# Patient Record
Sex: MALE | Race: WHITE | NOT HISPANIC OR LATINO | Employment: FULL TIME | URBAN - METROPOLITAN AREA
[De-identification: names, ages, dates, MRNs, and addresses within clinical notes are randomized per-mention and may not be internally consistent; named-entity substitution may affect disease eponyms.]

---

## 2019-04-26 ENCOUNTER — HOSPITAL ENCOUNTER (EMERGENCY)
Facility: HOSPITAL | Age: 24
Discharge: HOME | End: 2019-04-26
Attending: EMERGENCY MEDICINE
Payer: COMMERCIAL

## 2019-04-26 VITALS
DIASTOLIC BLOOD PRESSURE: 78 MMHG | WEIGHT: 195 LBS | RESPIRATION RATE: 20 BRPM | HEIGHT: 69 IN | SYSTOLIC BLOOD PRESSURE: 145 MMHG | OXYGEN SATURATION: 97 % | BODY MASS INDEX: 28.88 KG/M2 | HEART RATE: 94 BPM | TEMPERATURE: 99 F

## 2019-04-26 DIAGNOSIS — R51.9 ACUTE NONINTRACTABLE HEADACHE, UNSPECIFIED HEADACHE TYPE: ICD-10-CM

## 2019-04-26 DIAGNOSIS — J06.9 VIRAL URI WITH COUGH: Primary | ICD-10-CM

## 2019-04-26 DIAGNOSIS — R05.9 COUGH: ICD-10-CM

## 2019-04-26 PROCEDURE — 99283 EMERGENCY DEPT VISIT LOW MDM: CPT

## 2019-04-26 PROCEDURE — 25000003 PHARM REV CODE 250: Performed by: PHYSICIAN ASSISTANT

## 2019-04-26 RX ORDER — KETOROLAC TROMETHAMINE 10 MG/1
10 TABLET, FILM COATED ORAL
Status: COMPLETED | OUTPATIENT
Start: 2019-04-26 | End: 2019-04-26

## 2019-04-26 RX ORDER — ONDANSETRON 4 MG/1
4 TABLET, ORALLY DISINTEGRATING ORAL EVERY 6 HOURS PRN
Qty: 20 TABLET | Refills: 0 | Status: ON HOLD | OUTPATIENT
Start: 2019-04-26 | End: 2019-08-02 | Stop reason: HOSPADM

## 2019-04-26 RX ORDER — KETOROLAC TROMETHAMINE 10 MG/1
10 TABLET, FILM COATED ORAL EVERY 6 HOURS
Qty: 12 TABLET | Refills: 0 | Status: SHIPPED | OUTPATIENT
Start: 2019-04-26 | End: 2019-04-29

## 2019-04-26 RX ORDER — ONDANSETRON 4 MG/1
4 TABLET, ORALLY DISINTEGRATING ORAL
Status: COMPLETED | OUTPATIENT
Start: 2019-04-26 | End: 2019-04-26

## 2019-04-26 RX ADMIN — KETOROLAC TROMETHAMINE 10 MG: 10 TABLET, FILM COATED ORAL at 05:04

## 2019-04-26 RX ADMIN — ONDANSETRON 4 MG: 4 TABLET, ORALLY DISINTEGRATING ORAL at 05:04

## 2019-04-26 NOTE — ED NOTES
Patient identifiers for Flo Wayne checked and correct.    LOC: The patient is awake, alert and aware of environment with an appropriate affect, the patient is oriented x 3 and speaking appropriately.  APPEARANCE: Patient resting comfortably and in no acute distress, patient is clean and well groomed, patient's clothing are properly fastened.  SKIN: The skin is warm and dry, patient has age appropriate skin turgor and moist mucus membranes, skin intact, no breakdown or bruising noted.  MUSCULOSKELETAL: Patient moving all extremities well, no obvious swelling or deformities noted.  RESPIRATORY: Airway is open and patent, respirations are spontaneous, patient has a normal effort and rate, no accessory muscle use noted.  Clear breath sounds bilaterally.  CARDIAC: Patient has a normal rate and rhythm, no periphreal edema noted, capillary refill < 3 seconds.  ABDOMEN: Soft and non tender to palpation, no distention noted.  Normoactive bowel sounds x4.  NEUROLOGIC: PERRL, facial expression is symmetrical, bilateral hand grasp equal and even, normal sensation in all extremities when touched with a finger.

## 2019-04-26 NOTE — ED PROVIDER NOTES
"Encounter Date: 4/26/2019       History     Chief Complaint   Patient presents with    Cough     with nasal congestion, pt states productive " brown cough" , subjective fever at home     Sore Throat     x 3 days      Flo Wayne, a 23 y.o. male  has no past medical history on file.     He presents to the ED evaluation of productive cough with sore throat, subjective fever and headache x 3 days.  Headache is consistent with previous headaches.  Patient states that he has been given previous prescription for Toradol that usually helps however he has been unable to establish care since recently moving from [place] and has no access to Toradol at this time.  Treatments tried for headache include ibuprofen.  Treatments tried for URI symptoms and cough include over-the-counter medications that have as little improvement to his symptoms. 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  Does have a recent history of pneumonia in July of last year and states that he feels very similar to when pneumonia started then.          The history is provided by the patient.     Review of patient's allergies indicates:  No Known Allergies  No past medical history on file.  No past surgical history on file.  History reviewed. No pertinent family history.  Social History     Tobacco Use    Smoking status: Not on file   Substance Use Topics    Alcohol use: Not on file    Drug use: Not on file     Review of Systems   Constitutional: Positive for chills and fever (subjective).   HENT: Positive for congestion and sore throat.    Respiratory: Positive for cough. Negative for shortness of breath.    Gastrointestinal: Positive for nausea. Negative for abdominal pain and vomiting.   Skin: Negative for color change.   Neurological: Positive for headaches. Negative for weakness and numbness.   Psychiatric/Behavioral: Negative for agitation.       Physical Exam     Initial Vitals " [04/26/19 1635]   BP Pulse Resp Temp SpO2   (!) 145/78 (!) 112 20 98.8 °F (37.1 °C) 97 %      MAP       --         Physical Exam    Nursing note and vitals reviewed.  Constitutional: He appears well-developed and well-nourished.   HENT:   Head: Normocephalic and atraumatic.   Right Ear: Hearing, tympanic membrane, external ear and ear canal normal.   Left Ear: Hearing, tympanic membrane, external ear and ear canal normal.   Nose: Mucosal edema present.   Mouth/Throat: Uvula is midline, oropharynx is clear and moist and mucous membranes are normal.   Eyes: EOM are normal.   Neck: Normal range of motion. Neck supple.   Cardiovascular: Normal rate and regular rhythm.   Pulmonary/Chest: Breath sounds normal. No stridor. No respiratory distress. He has no wheezes. He has no rhonchi. He has no rales. He exhibits no tenderness.   Musculoskeletal: Normal range of motion. He exhibits no edema or tenderness.   Neurological: He is alert and oriented to person, place, and time. No cranial nerve deficit or sensory deficit. GCS score is 15. GCS eye subscore is 4. GCS verbal subscore is 5. GCS motor subscore is 6.   Skin: Skin is warm and dry. Capillary refill takes less than 2 seconds. No rash and no abscess noted. No erythema.   Psychiatric: He has a normal mood and affect. Thought content normal.         ED Course   Procedures  Labs Reviewed - No data to display       Imaging Results          X-Ray Chest PA And Lateral (Final result)  Result time 04/26/19 18:21:20    Final result by Nayely Beth MD (04/26/19 18:21:20)                 Impression:      No acute abnormality.      Electronically signed by: Nayely Beth MD  Date:    04/26/2019  Time:    18:21             Narrative:    EXAMINATION:  XR CHEST PA AND LATERAL    CLINICAL HISTORY:  Cough    TECHNIQUE:  PA and lateral views of the chest were performed.    COMPARISON:  None    FINDINGS:  The lungs are clear, with normal appearance of pulmonary vasculature and no  pleural effusion or pneumothorax.    The cardiac silhouette is normal in size. The hilar and mediastinal contours are unremarkable.    Bones are intact.                                 Medical Decision Making:   Initial Assessment:   URI symptoms, productive cough, headache  Differential Diagnosis:   Differential Diagnosis includes, but is not limited to:  Sepsis, meningitis, nasal/aspirated foreign body, OM, OE, nasal polyp, bacterial sinusitis, allergic rhinitis, peritonsillar abscess, retropharyngeal abscess, epiglottitis, bacterial/viral pneumonia, bacterial/viral pharyngitis, croup, bronchiolitis, influenza, viral syndrome    ED Management:  Patient presents to ED for evaluation of URI symptoms x 4 days.  CXR shows no acute abnormality.  After complete evaluation, including thorough history and physical exam, the patient's symptoms are most likely due to viral upper respiratory infection. There are no concerning features on physical exam to suggest bacterial otitis media/externa, sinusitis, pharyngitis, or peritonsillar abscess. Vital signs do not suggest sepsis. Lung sounds are clear and not consistent with pneumonia. There is no neck pain or limited ROM to suggest retropharyngeal abscess or meningitis. The patient will be treated with supportive care. Will provide fever chart for proper dosing.  Encouraged follow-up with PCP for further evaluation.                            Clinical Impression:       ICD-10-CM ICD-9-CM   1. Viral URI with cough J06.9 465.9    B97.89    2. Cough R05 786.2   3. Acute nonintractable headache, unspecified headache type R51 784.0                                Diya Hirsch PA-C  04/26/19 2020

## 2019-07-29 ENCOUNTER — HOSPITAL ENCOUNTER (EMERGENCY)
Facility: HOSPITAL | Age: 24
Discharge: HOME | End: 2019-07-29
Attending: EMERGENCY MEDICINE
Payer: COMMERCIAL

## 2019-07-29 VITALS
HEIGHT: 69 IN | DIASTOLIC BLOOD PRESSURE: 63 MMHG | WEIGHT: 197 LBS | HEART RATE: 102 BPM | TEMPERATURE: 100 F | OXYGEN SATURATION: 100 % | SYSTOLIC BLOOD PRESSURE: 117 MMHG | RESPIRATION RATE: 20 BRPM | BODY MASS INDEX: 29.18 KG/M2

## 2019-07-29 DIAGNOSIS — J02.9 VIRAL PHARYNGITIS: ICD-10-CM

## 2019-07-29 DIAGNOSIS — R50.9 FEVER, UNSPECIFIED FEVER CAUSE: Primary | ICD-10-CM

## 2019-07-29 LAB — DEPRECATED S PYO AG THROAT QL EIA: NEGATIVE

## 2019-07-29 PROCEDURE — 87880 STREP A ASSAY W/OPTIC: CPT

## 2019-07-29 PROCEDURE — 25000003 PHARM REV CODE 250: Performed by: PHYSICIAN ASSISTANT

## 2019-07-29 PROCEDURE — 25000003 PHARM REV CODE 250: Performed by: EMERGENCY MEDICINE

## 2019-07-29 PROCEDURE — 87081 CULTURE SCREEN ONLY: CPT

## 2019-07-29 PROCEDURE — 99284 EMERGENCY DEPT VISIT MOD MDM: CPT

## 2019-07-29 RX ORDER — ACETAMINOPHEN 500 MG
1000 TABLET ORAL
Status: COMPLETED | OUTPATIENT
Start: 2019-07-29 | End: 2019-07-29

## 2019-07-29 RX ORDER — IBUPROFEN 600 MG/1
600 TABLET ORAL EVERY 6 HOURS PRN
Qty: 30 TABLET | Refills: 0 | Status: ON HOLD | OUTPATIENT
Start: 2019-07-29 | End: 2019-08-02 | Stop reason: HOSPADM

## 2019-07-29 RX ORDER — ACETAMINOPHEN 325 MG/1
650 TABLET ORAL EVERY 6 HOURS PRN
Qty: 30 TABLET | Refills: 0 | Status: ON HOLD | OUTPATIENT
Start: 2019-07-29 | End: 2019-08-02 | Stop reason: HOSPADM

## 2019-07-29 RX ORDER — ONDANSETRON 4 MG/1
4 TABLET, ORALLY DISINTEGRATING ORAL
Status: COMPLETED | OUTPATIENT
Start: 2019-07-29 | End: 2019-07-29

## 2019-07-29 RX ORDER — IBUPROFEN 600 MG/1
600 TABLET ORAL
Status: COMPLETED | OUTPATIENT
Start: 2019-07-29 | End: 2019-07-29

## 2019-07-29 RX ADMIN — IBUPROFEN 600 MG: 600 TABLET, FILM COATED ORAL at 02:07

## 2019-07-29 RX ADMIN — ACETAMINOPHEN 1000 MG: 500 TABLET ORAL at 02:07

## 2019-07-29 RX ADMIN — ONDANSETRON 4 MG: 4 TABLET, ORALLY DISINTEGRATING ORAL at 02:07

## 2019-07-29 NOTE — ED NOTES
Patient presents to ED with c/o Throat pain, N/V/D and chills. Patient states he has been having symptoms since last night.         APPEARANCE: Alert, oriented and in no acute distress.  CARDIAC: Normal rate and rhythm, no murmur heard. TACHYCARDIA   PERIPHERAL VASCULAR: peripheral pulses present. Normal cap refill. No edema. Warm to touch.    RESPIRATORY:Normal rate and effort, breath sounds clear bilaterally throughout chest. Respirations are equal and unlabored no obvious signs of distress.  GASTRO: soft, bowel sounds normal, no tenderness, no abdominal distention.  MUSC: Full ROM. No bony tenderness or soft tissue tenderness. No obvious deformity.  SKIN: Skin is warm and dry, normal skin turgor, mucous membranes moist.  NEURO: 5/5 strength major flexors/extensors bilaterally. Sensory intact to light touch bilaterally. Yareli coma scale: eyes open spontaneously-4, oriented & converses-5, obeys commands-6. No neurological abnormalities.   MENTAL STATUS: awake, alert and aware of environment.  EYE: PERRL, both eyes: pupils brisk and reactive to light. Normal size.  ENT: EARS: no obvious drainage. NOSE: no active bleeding.

## 2019-07-29 NOTE — ED PROVIDER NOTES
Encounter Date: 7/29/2019    SCRIBE #1 NOTE: I, Lyudmila Dietrich, am scribing for, and in the presence of,  Dr. Encarnacion. I have scribed the entire note.       History     Chief Complaint   Patient presents with    Sore Throat     woke last pm with severe sore throat, subjective fever, chills with nausea and 3 episodes of vomiting today     Flo Wayne is a 24 y.o. male who  has no past medical history on file.    The patient presents to the ED due to sore throat. He reports symptoms started yesterday. He reports associated chills and a few episodes of vomiting yesterday. He reports his boss at work was sick for a few days last week with similar symptoms.   He denies any headache, vision changes, focal weakness, difficulty speaking/swallowing, facial pain/swelling, or any other complaints.     The history is provided by the patient.     Review of patient's allergies indicates:  No Known Allergies  History reviewed. No pertinent past medical history.  History reviewed. No pertinent surgical history.  History reviewed. No pertinent family history.  Social History     Tobacco Use    Smoking status: Never Smoker    Smokeless tobacco: Never Used   Substance Use Topics    Alcohol use: Never     Frequency: Never    Drug use: Never     Review of Systems   Constitutional: Positive for chills and fever.   HENT: Positive for sore throat. Negative for congestion, dental problem, facial swelling, nosebleeds, rhinorrhea, sinus pressure, sinus pain and trouble swallowing.    Eyes: Negative for redness.   Respiratory: Negative for shortness of breath.    Cardiovascular: Negative for chest pain.   Gastrointestinal: Positive for nausea and vomiting. Negative for abdominal pain, constipation and diarrhea.   Genitourinary: Negative for dysuria, frequency, hematuria and urgency.   Musculoskeletal: Negative for back pain and gait problem.   Skin: Negative for rash and wound.   Neurological: Negative for facial asymmetry, speech difficulty  and weakness.   Hematological: Does not bruise/bleed easily.   Psychiatric/Behavioral: Negative for agitation, behavioral problems and confusion.       Physical Exam     Initial Vitals [07/29/19 1324]   BP Pulse Resp Temp SpO2   134/72 (!) 130 20 (!) 101 °F (38.3 °C) 97 %      MAP       --         Physical Exam    Nursing note and vitals reviewed.  Constitutional: He appears well-developed and well-nourished. He is not diaphoretic. No distress.   Well-appearing, NAD.   HENT:   Head: Normocephalic and atraumatic. Head is without abrasion and without contusion.   Right Ear: Hearing, tympanic membrane, external ear and ear canal normal. No middle ear effusion.   Left Ear: Hearing, tympanic membrane, external ear and ear canal normal.  No middle ear effusion.   Nose: Nose normal. No rhinorrhea, nasal deformity or septal deviation.   Mouth/Throat: Uvula is midline and mucous membranes are normal. No trismus in the jaw. Normal dentition. No uvula swelling or lacerations. Posterior oropharyngeal erythema (mild) present. No oropharyngeal exudate, posterior oropharyngeal edema or tonsillar abscesses.   Eyes: Conjunctivae and EOM are normal.   Neck: Normal range of motion. Neck supple.   Cardiovascular: Normal rate, regular rhythm and normal heart sounds. Exam reveals no gallop and no friction rub.    No murmur heard.  Pulmonary/Chest: Breath sounds normal. He has no wheezes. He has no rhonchi. He has no rales.   Abdominal: Soft. There is no tenderness. There is no rebound and no guarding.   Musculoskeletal: Normal range of motion. He exhibits no edema or tenderness.   Lymphadenopathy:     He has no cervical adenopathy.   Neurological: He is alert and oriented to person, place, and time. He has normal strength.   Skin: Skin is warm and dry. No rash noted.         ED Course   Procedures  Labs Reviewed   THROAT SCREEN, RAPID          Imaging Results    None          Medical Decision Making:   Initial Assessment:   25 yo M with  fever, sore throat.  Exam reassuring, no tonsillar exudates or peritonsillar abscess.  Will obtain rapid strep, given Tylenol/ibuprofen for fever, and reassess.  Differential Diagnosis:   Differential Diagnosis includes, but is not limited to:  Que's angina, epiglottitis, foreign body aspiration, retropharyngeal abscess, peritonsillar abscess, esophageal perforation, mediastinitis, esophagitis, sialolithiasis, parotitis, laryngitis, tracheitis, dental/periapical abscess, TMJ disorder, pneumonia, Streptococcal/bacterial pharyngitis, viral pharyngitis.    Clinical Tests:   Lab Tests: Ordered and Reviewed  ED Management:  Rapid strep negative. No indication for ABX at this time.  Patient counseled on symptomatic and supportive care.    After complete evaluation, including thorough history and physical exam, the patient's symptoms are most likely due to viral upper respiratory infection. There are no concerning features on physical exam to suggest bacterial otitis media/externa, sinusitis, pharyngitis, or peritonsillar abscess. Vital signs do not suggest sepsis. Lung sounds are clear and not consistent with pneumonia. There is no neck pain or limited ROM to suggest retropharyngeal abscess or meningitis. The patient will be treated with supportive care.  Upon re-evaluation, the patient's status has improved.  After complete ED evaluation, clinical impression is most consistent with viral pharyngitis.  PCP follow-up within 2-3 days was recommended.    After taking into careful account the patient's history, physical exam findings, as well as empirical and objective data obtained throughout ED workup, I feel no emergent medical condition has been identified. No further evaluation or admission was felt to be required, and the patient is stable for discharge from the ED. The patient and any additional family present were updated with test results, overall clinical impression, and recommended further plan of care,  including discharge instructions as provided and outpatient follow-up for continued evaluation and management as needed. All questions were answered. The patient expressed understanding and agreed with current plan for discharge and follow-up plan of care. Strict ED return precautions were provided, including return/worsening of current symptoms, new symptoms, or any other concerns.                        Clinical Impression:       ICD-10-CM ICD-9-CM   1. Fever, unspecified fever cause R50.9 780.60   2. Viral pharyngitis J02.9 462       Disposition:   Disposition: Discharged  Condition: Stable        I, Dr. Bryson Encarnacion, personally performed the services described in this documentation. All medical record entries made by the scribe were at my direction and in my presence.  I have reviewed the chart and agree that the record reflects my personal performance and is accurate and complete.     Bryson Encarnacion MD.     Bryson Encarnacion MD  07/31/19 0806

## 2019-07-31 ENCOUNTER — HOSPITAL ENCOUNTER (OUTPATIENT)
Facility: HOSPITAL | Age: 24
Discharge: HOME | End: 2019-08-02
Attending: EMERGENCY MEDICINE | Admitting: INTERNAL MEDICINE
Payer: COMMERCIAL

## 2019-07-31 DIAGNOSIS — G40.909 SEIZURE DISORDER: ICD-10-CM

## 2019-07-31 DIAGNOSIS — E87.6 HYPOKALEMIA: ICD-10-CM

## 2019-07-31 DIAGNOSIS — A41.9 SEPSIS: ICD-10-CM

## 2019-07-31 DIAGNOSIS — E86.0 DEHYDRATION: ICD-10-CM

## 2019-07-31 DIAGNOSIS — A08.4 VIRAL GASTROENTERITIS: Primary | ICD-10-CM

## 2019-07-31 PROBLEM — G43.909 MIGRAINE: Status: ACTIVE | Noted: 2019-07-31

## 2019-07-31 LAB
ALBUMIN SERPL BCP-MCNC: 3.8 G/DL (ref 3.5–5.2)
ALP SERPL-CCNC: 95 U/L (ref 55–135)
ALT SERPL W/O P-5'-P-CCNC: 30 U/L (ref 10–44)
AMPHET+METHAMPHET UR QL: NEGATIVE
ANION GAP SERPL CALC-SCNC: 13 MMOL/L (ref 8–16)
AST SERPL-CCNC: 18 U/L (ref 10–40)
BACTERIA #/AREA URNS HPF: ABNORMAL /HPF
BACTERIA THROAT CULT: NORMAL
BARBITURATES UR QL SCN>200 NG/ML: NEGATIVE
BASOPHILS # BLD AUTO: 0.01 K/UL (ref 0–0.2)
BASOPHILS NFR BLD: 0.1 % (ref 0–1.9)
BENZODIAZ UR QL SCN>200 NG/ML: NEGATIVE
BILIRUB SERPL-MCNC: 0.9 MG/DL (ref 0.1–1)
BILIRUB UR QL STRIP: ABNORMAL
BUN SERPL-MCNC: 9 MG/DL (ref 6–20)
BZE UR QL SCN: NEGATIVE
CALCIUM SERPL-MCNC: 9.5 MG/DL (ref 8.7–10.5)
CANNABINOIDS UR QL SCN: NEGATIVE
CHLORIDE SERPL-SCNC: 100 MMOL/L (ref 95–110)
CLARITY UR: CLEAR
CO2 SERPL-SCNC: 25 MMOL/L (ref 23–29)
COLOR UR: YELLOW
CREAT SERPL-MCNC: 1.3 MG/DL (ref 0.5–1.4)
CREAT UR-MCNC: 342.8 MG/DL (ref 23–375)
DEPRECATED S PYO AG THROAT QL EIA: NEGATIVE
DIFFERENTIAL METHOD: ABNORMAL
EOSINOPHIL # BLD AUTO: 0 K/UL (ref 0–0.5)
EOSINOPHIL NFR BLD: 0.2 % (ref 0–8)
ERYTHROCYTE [DISTWIDTH] IN BLOOD BY AUTOMATED COUNT: 12.9 % (ref 11.5–14.5)
EST. GFR  (AFRICAN AMERICAN): >60 ML/MIN/1.73 M^2
EST. GFR  (NON AFRICAN AMERICAN): >60 ML/MIN/1.73 M^2
GLUCOSE SERPL-MCNC: 107 MG/DL (ref 70–110)
GLUCOSE UR QL STRIP: NEGATIVE
HCT VFR BLD AUTO: 47.2 % (ref 40–54)
HETEROPH AB SERPL QL IA: NEGATIVE
HGB BLD-MCNC: 15.6 G/DL (ref 14–18)
HGB UR QL STRIP: ABNORMAL
HYALINE CASTS #/AREA URNS LPF: 5 /LPF
INFLUENZA A, MOLECULAR: NEGATIVE
INFLUENZA B, MOLECULAR: NEGATIVE
KETONES UR QL STRIP: ABNORMAL
LACTATE SERPL-SCNC: 0.5 MMOL/L (ref 0.5–2.2)
LACTATE SERPL-SCNC: 1.6 MMOL/L (ref 0.5–2.2)
LEUKOCYTE ESTERASE UR QL STRIP: NEGATIVE
LYMPHOCYTES # BLD AUTO: 1 K/UL (ref 1–4.8)
LYMPHOCYTES NFR BLD: 7.4 % (ref 18–48)
MCH RBC QN AUTO: 28.2 PG (ref 27–31)
MCHC RBC AUTO-ENTMCNC: 33.1 G/DL (ref 32–36)
MCV RBC AUTO: 85 FL (ref 82–98)
METHADONE UR QL SCN>300 NG/ML: NEGATIVE
MICROSCOPIC COMMENT: ABNORMAL
MONOCYTES # BLD AUTO: 0.9 K/UL (ref 0.3–1)
MONOCYTES NFR BLD: 7 % (ref 4–15)
NEUTROPHILS # BLD AUTO: 11.4 K/UL (ref 1.8–7.7)
NEUTROPHILS NFR BLD: 85.3 % (ref 38–73)
NITRITE UR QL STRIP: NEGATIVE
OPIATES UR QL SCN: NEGATIVE
PCP UR QL SCN>25 NG/ML: NEGATIVE
PH UR STRIP: 7 [PH] (ref 5–8)
PLATELET # BLD AUTO: 231 K/UL (ref 150–350)
PMV BLD AUTO: 9.7 FL (ref 9.2–12.9)
POTASSIUM SERPL-SCNC: 3.4 MMOL/L (ref 3.5–5.1)
PROT SERPL-MCNC: 7.5 G/DL (ref 6–8.4)
PROT UR QL STRIP: ABNORMAL
RBC # BLD AUTO: 5.54 M/UL (ref 4.6–6.2)
RBC #/AREA URNS HPF: 2 /HPF (ref 0–4)
SODIUM SERPL-SCNC: 138 MMOL/L (ref 136–145)
SP GR UR STRIP: 1.01 (ref 1–1.03)
SPECIMEN SOURCE: NORMAL
TOXICOLOGY INFORMATION: NORMAL
URN SPEC COLLECT METH UR: ABNORMAL
UROBILINOGEN UR STRIP-ACNC: NEGATIVE EU/DL
WBC # BLD AUTO: 13.35 K/UL (ref 3.9–12.7)
WBC #/AREA STL HPF: ABNORMAL /[HPF]
WBC #/AREA URNS HPF: 5 /HPF (ref 0–5)

## 2019-07-31 PROCEDURE — G0378 HOSPITAL OBSERVATION PER HR: HCPCS

## 2019-07-31 PROCEDURE — 83036 HEMOGLOBIN GLYCOSYLATED A1C: CPT

## 2019-07-31 PROCEDURE — 99291 CRITICAL CARE FIRST HOUR: CPT | Mod: 25

## 2019-07-31 PROCEDURE — 63600175 PHARM REV CODE 636 W HCPCS: Performed by: STUDENT IN AN ORGANIZED HEALTH CARE EDUCATION/TRAINING PROGRAM

## 2019-07-31 PROCEDURE — 89055 LEUKOCYTE ASSESSMENT FECAL: CPT

## 2019-07-31 PROCEDURE — 93010 ELECTROCARDIOGRAM REPORT: CPT | Mod: ,,, | Performed by: INTERNAL MEDICINE

## 2019-07-31 PROCEDURE — 80307 DRUG TEST PRSMV CHEM ANLYZR: CPT

## 2019-07-31 PROCEDURE — 93005 ELECTROCARDIOGRAM TRACING: CPT

## 2019-07-31 PROCEDURE — 81000 URINALYSIS NONAUTO W/SCOPE: CPT

## 2019-07-31 PROCEDURE — 87045 FECES CULTURE AEROBIC BACT: CPT

## 2019-07-31 PROCEDURE — 96361 HYDRATE IV INFUSION ADD-ON: CPT

## 2019-07-31 PROCEDURE — 85025 COMPLETE CBC W/AUTO DIFF WBC: CPT

## 2019-07-31 PROCEDURE — 93010 EKG 12-LEAD: ICD-10-PCS | Mod: ,,, | Performed by: INTERNAL MEDICINE

## 2019-07-31 PROCEDURE — 87502 INFLUENZA DNA AMP PROBE: CPT

## 2019-07-31 PROCEDURE — 83605 ASSAY OF LACTIC ACID: CPT | Mod: 91

## 2019-07-31 PROCEDURE — 87880 STREP A ASSAY W/OPTIC: CPT

## 2019-07-31 PROCEDURE — 36415 COLL VENOUS BLD VENIPUNCTURE: CPT

## 2019-07-31 PROCEDURE — 86703 HIV-1/HIV-2 1 RESULT ANTBDY: CPT

## 2019-07-31 PROCEDURE — 87081 CULTURE SCREEN ONLY: CPT

## 2019-07-31 PROCEDURE — 96374 THER/PROPH/DIAG INJ IV PUSH: CPT

## 2019-07-31 PROCEDURE — 80053 COMPREHEN METABOLIC PANEL: CPT

## 2019-07-31 PROCEDURE — 63600175 PHARM REV CODE 636 W HCPCS: Performed by: PHYSICIAN ASSISTANT

## 2019-07-31 PROCEDURE — 87046 STOOL CULTR AEROBIC BACT EA: CPT | Mod: 59

## 2019-07-31 PROCEDURE — 87040 BLOOD CULTURE FOR BACTERIA: CPT

## 2019-07-31 PROCEDURE — 87209 SMEAR COMPLEX STAIN: CPT

## 2019-07-31 PROCEDURE — 25000003 PHARM REV CODE 250: Performed by: PHYSICIAN ASSISTANT

## 2019-07-31 PROCEDURE — 25000003 PHARM REV CODE 250: Performed by: STUDENT IN AN ORGANIZED HEALTH CARE EDUCATION/TRAINING PROGRAM

## 2019-07-31 PROCEDURE — 86308 HETEROPHILE ANTIBODY SCREEN: CPT

## 2019-07-31 PROCEDURE — 25500020 PHARM REV CODE 255: Performed by: EMERGENCY MEDICINE

## 2019-07-31 PROCEDURE — 87427 SHIGA-LIKE TOXIN AG IA: CPT

## 2019-07-31 RX ORDER — ONDANSETRON 2 MG/ML
4 INJECTION INTRAMUSCULAR; INTRAVENOUS
Status: COMPLETED | OUTPATIENT
Start: 2019-07-31 | End: 2019-07-31

## 2019-07-31 RX ORDER — DICYCLOMINE HYDROCHLORIDE 10 MG/1
10 CAPSULE ORAL 4 TIMES DAILY PRN
Status: DISCONTINUED | OUTPATIENT
Start: 2019-07-31 | End: 2019-08-02 | Stop reason: HOSPADM

## 2019-07-31 RX ORDER — IBUPROFEN 600 MG/1
600 TABLET ORAL
Status: COMPLETED | OUTPATIENT
Start: 2019-07-31 | End: 2019-07-31

## 2019-07-31 RX ORDER — GLUCAGON 1 MG
1 KIT INJECTION
Status: DISCONTINUED | OUTPATIENT
Start: 2019-07-31 | End: 2019-08-02 | Stop reason: HOSPADM

## 2019-07-31 RX ORDER — POTASSIUM CHLORIDE 20 MEQ/1
20 TABLET, EXTENDED RELEASE ORAL
Status: COMPLETED | OUTPATIENT
Start: 2019-07-31 | End: 2019-08-01

## 2019-07-31 RX ORDER — IBUPROFEN 200 MG
24 TABLET ORAL
Status: DISCONTINUED | OUTPATIENT
Start: 2019-07-31 | End: 2019-08-02 | Stop reason: HOSPADM

## 2019-07-31 RX ORDER — SODIUM CHLORIDE 0.9 % (FLUSH) 0.9 %
10 SYRINGE (ML) INJECTION
Status: DISCONTINUED | OUTPATIENT
Start: 2019-07-31 | End: 2019-08-02 | Stop reason: HOSPADM

## 2019-07-31 RX ORDER — IBUPROFEN 200 MG
16 TABLET ORAL
Status: DISCONTINUED | OUTPATIENT
Start: 2019-07-31 | End: 2019-08-02 | Stop reason: HOSPADM

## 2019-07-31 RX ORDER — ACETAMINOPHEN 325 MG/1
650 TABLET ORAL EVERY 6 HOURS PRN
Status: DISCONTINUED | OUTPATIENT
Start: 2019-07-31 | End: 2019-08-01

## 2019-07-31 RX ORDER — SODIUM CHLORIDE, SODIUM LACTATE, POTASSIUM CHLORIDE, CALCIUM CHLORIDE 600; 310; 30; 20 MG/100ML; MG/100ML; MG/100ML; MG/100ML
INJECTION, SOLUTION INTRAVENOUS CONTINUOUS
Status: ACTIVE | OUTPATIENT
Start: 2019-07-31 | End: 2019-08-01

## 2019-07-31 RX ORDER — ACETAMINOPHEN 500 MG
1000 TABLET ORAL
Status: COMPLETED | OUTPATIENT
Start: 2019-07-31 | End: 2019-07-31

## 2019-07-31 RX ADMIN — POTASSIUM CHLORIDE 20 MEQ: 20 TABLET, EXTENDED RELEASE ORAL at 10:07

## 2019-07-31 RX ADMIN — ACETAMINOPHEN 650 MG: 325 TABLET ORAL at 10:07

## 2019-07-31 RX ADMIN — ONDANSETRON 4 MG: 2 INJECTION INTRAMUSCULAR; INTRAVENOUS at 02:07

## 2019-07-31 RX ADMIN — SODIUM CHLORIDE 2000 ML: 0.9 INJECTION, SOLUTION INTRAVENOUS at 02:07

## 2019-07-31 RX ADMIN — SODIUM CHLORIDE 700 ML: 0.9 INJECTION, SOLUTION INTRAVENOUS at 05:07

## 2019-07-31 RX ADMIN — SODIUM CHLORIDE, SODIUM LACTATE, POTASSIUM CHLORIDE, AND CALCIUM CHLORIDE: .6; .31; .03; .02 INJECTION, SOLUTION INTRAVENOUS at 10:07

## 2019-07-31 RX ADMIN — ACETAMINOPHEN 1000 MG: 500 TABLET ORAL at 02:07

## 2019-07-31 RX ADMIN — IOHEXOL 100 ML: 350 INJECTION, SOLUTION INTRAVENOUS at 04:07

## 2019-07-31 RX ADMIN — IBUPROFEN 600 MG: 600 TABLET, FILM COATED ORAL at 05:07

## 2019-07-31 NOTE — LETTER
August 2, 2019               [facility]  [address]  [place]  [address]  Phone: [phone]  Fax: [phone] August 2, 2019     Patient: Flo Wayne   YOB: 1995       To Whom It May Concern:    Flo Wayne was admitted to the hospital on 7/31/2019  1:56 PM and discharged on 8/2/2019.  If you have any questions or concerns, please don't hesitate to call me at [phone].      Sincerely,        Sabina Montemayor MD  Department of Hospital Medicine

## 2019-07-31 NOTE — ED NOTES
APPEARANCE: Alert, oriented and in no acute distress.  CARDIAC: Sinus tachycardia, no murmur heard.   PERIPHERAL VASCULAR: peripheral pulses present. Normal cap refill. No edema. Warm to touch.    RESPIRATORY:Normal rate and effort, breath sounds clear bilaterally throughout chest. Respirations are equal and unlabored no obvious signs of distress.  GASTRO: soft, bowel sounds normal, left sided tenderness, no abdominal distention.  MUSC: Full ROM. No bony tenderness or soft tissue tenderness. No obvious deformity.  SKIN: Skin is warm and dry, normal skin turgor, mucous membranes moist.  NEURO: 5/5 strength major flexors/extensors bilaterally. Sensory intact to light touch bilaterally. Yareli coma scale: eyes open spontaneously-4, oriented & converses-5, obeys commands-6. No neurological abnormalities.   MENTAL STATUS: awake, alert and aware of environment.  EYE: PERRL, both eyes: pupils brisk and reactive to light. Normal size.

## 2019-07-31 NOTE — MEDICAL/APP STUDENT
"[facility] Internal Medicine History and Physical - L4 Medical Student Note    Admitting Team: Medicine Team A  Attending Physician: Audrey  Resident: Arley  Interns: Oneil    Date of Admit: 7/31/2019    Chief Complaint     Abdominal Pain (reports LLQ abd pain that started 3 days ago with n/v and fever. reports fever 2days. last dose ibuprofen at 1100. reports nausea is constant. )  associated with fever, nausea, vomiting, and watery diarrhea    Subjective:      History of Present Illness:  Flo Wayne is a 24 y.o. male with past medical history of seizure disorder and migraines. The patient presented to [facility] on 7/31/2019 with a primary complaint of Abdominal Pain (reports LLQ abd pain that started 3 days ago with n/v and fever. reports fever 2days. last dose ibuprofen at 1100. reports nausea is constant. )    Patient states that he first started feeling abnormally on Sunday 7/28 when he began to have a sore throat. The next day, the throat pain became much worse, stating it was difficult to swallow food and water, and he started having additionally "cold flashes," nausea, vomiting, headache, watery diarrhea and abdominal pain. When asked to quantify how many episodes of vomiting and diarrhea, he states it was too many to count. Denied noticing any blood in either vomitus or diarrhea. Patient continued to have constant episodes of vomiting and diarrhea making it unable to keep down fluids. Went to the ED on Monday where he was diagnosed with viral gastroenteritis, given fluids, and told to return if symptoms did not improve. Tuesday the symptoms continued to deteriorate, and patient presented to the ED again today 7/31.    States that this has never happened to him before, stating that he has had stomach problems in the past with diarrhea but never with this acuity or severity. Denies any recent intake of raw shellfish, recent travel history, or drinking from freshwater streams/lakes.    Patient is " "new to our system having moved from [place] in February of this year.    Past Medical History:  Migraines, seizure disorder (hasn't had seizure in years, controlled on lamictal)  Pneumonia last year requiring ventilatory assistance in [place]. No residual deficits.    Past Surgical History:  None    Allergies:  NKDA    Home Medications:  Prior to Admission medications    Medication Sig Start Date End Date Taking? Authorizing Provider   acetaminophen (TYLENOL) 325 MG tablet Take 2 tablets (650 mg total) by mouth every 6 (six) hours as needed for Pain. 19  Bryson Encarnacion MD   ibuprofen (ADVIL,MOTRIN) 600 MG tablet Take 1 tablet (600 mg total) by mouth every 6 (six) hours as needed for Pain. 19  Bryson Encarnacion MD   ondansetron (ZOFRAN-ODT) 4 MG TbDL Take 1 tablet (4 mg total) by mouth every 6 (six) hours as needed. 19   Diya Hirsch PA-C   Lamictal, sumatriptan    Family History:  Mother with CHF, HLD, HTN, DM2  Sister with spina bifida ()    Social History:  Social History     Tobacco Use    Smoking status: Never Smoker    Smokeless tobacco: Never Used   Substance Use Topics    Alcohol use: Never     Frequency: Never    Drug use: Never       Review of Systems:  Pertinent positives and negatives are listed in HPI.  All other systems are reviewed and are negative.    Health Maintaince :   Primary Care Physician: None  Immunizations:   TDap is up to date.  Influenza is up to date.     Objective:   Last 24 Hour Vital Signs:  BP  Min: 105/57  Max: 131/78  Temp  Av.8 °F (38.2 °C)  Min: 99 °F (37.2 °C)  Max: 102.9 °F (39.4 °C)  Pulse  Av  Min: 110  Max: 144  Resp  Av.3  Min: 14  Max: 25  SpO2  Av %  Min: 98 %  Max: 98 %  Height  Av' 9" (175.3 cm)  Min: 5' 9" (175.3 cm)  Max: 5' 9" (175.3 cm)  Weight  Av.4 kg (197 lb)  Min: 89.4 kg (197 lb)  Max: 89.4 kg (197 lb)  Body mass index is 29.09 kg/m².  No intake/output data recorded.    Physical " Examination:  General: Alert and awake, sitting in the bed with vomit bag  Head:  Normocephalic and atraumatic  Neck:  Submandibular lymphadenopathy present bilaterally  Eyes:  PERRL; EOMi with anicteric sclera and clear conjunctivae  Mouth:  Oropharynx clear and without exudate, some pharyngeal erythema appreciated; moist mucous membranes  Cardio:  Regular rate and rhythm with normal S1 and S2; no murmurs or rubs  Resp:  CTAB and unlabored; no wheezes, crackles or rhonchi  Abdom: Soft, normoactive bowel sounds, tender to palpation diffusely but most pronounced in LLQ and RLQ. Liver span   3cm below costal margin  Extrem: WWP with no clubbing, cyanosis or edema  Skin:  No rashes, lesions, or color changes, cap refill <2 secs  Pulses: 2+ and symmetric distally  Neuro:  AAOx3; cooperative and pleasant with no focal deficits    Laboratory:  Most Recent Data:  CBC:   Lab Results   Component Value Date    WBC 13.35 (H) 07/31/2019    HGB 15.6 07/31/2019    HCT 47.2 07/31/2019     07/31/2019    MCV 85 07/31/2019    RDW 12.9 07/31/2019     BMP:   Lab Results   Component Value Date     07/31/2019    K 3.4 (L) 07/31/2019     07/31/2019    CO2 25 07/31/2019    BUN 9 07/31/2019    CREATININE 1.3 07/31/2019     07/31/2019    CALCIUM 9.5 07/31/2019     LFTs:   Lab Results   Component Value Date    PROT 7.5 07/31/2019    ALBUMIN 3.8 07/31/2019    BILITOT 0.9 07/31/2019    AST 18 07/31/2019    ALKPHOS 95 07/31/2019    ALT 30 07/31/2019     Urinalysis:   Lab Results   Component Value Date    COLORU Yellow 07/31/2019    SPECGRAV 1.015 07/31/2019    NITRITE Negative 07/31/2019    KETONESU 2+ (A) 07/31/2019    UROBILINOGEN Negative 07/31/2019    WBCUA 5 07/31/2019       Trended Lab Data:  Recent Labs   Lab 07/31/19  1421   WBC 13.35*   HGB 15.6   HCT 47.2      MCV 85   RDW 12.9      K 3.4*      CO2 25   BUN 9   CREATININE 1.3      PROT 7.5   ALBUMIN 3.8   BILITOT 0.9   AST 18    ALKPHOS 95   ALT 30       Microbiology Data:  Stool cx, stool ova and parasites, blood cx pending  Stool WBC shows many neutrophils  E coli 0157 antigen pending  Influenza, strep, heterophile Ab negative    Radiology:  Imaging Results          CT Abdomen Pelvis With Contrast (Final result)  Result time 07/31/19 16:39:32    Final result by Gerson Chopra MD (07/31/19 16:39:32)                 Impression:      Normal appendix.  No urolithiasis or hydronephrosis.  No bowel dilation or inflammatory process.      Electronically signed by: Gerson Chopra  Date:    07/31/2019  Time:    16:39             Narrative:    EXAMINATION:  CT ABDOMEN PELVIS WITH CONTRAST    CLINICAL HISTORY:  Nausea, vomiting, diarrhea;    TECHNIQUE:  Low dose axial images, sagittal and coronal reformations were obtained from the lung bases to the pubic symphysis following the IV administration of 100 mL of Omnipaque 350 without oral contrast.    COMPARISON:  None.    FINDINGS:  Chest: Visualized heart appears normal.  No pericardial effusion.  The visualized esophagus and lungs are normal.  No pneumothorax or pleural effusion or interstitial edema.    Abdomen: The liver, spleen, pancreas, adrenal glands, gallbladder appear normal.  The hepatic veins and portal veins and SMV and splenic vein show normal enhancement.    The abdominal aorta and IVC appear normal.  No periaortic adenopathy found.  Kidneys enhance symmetrically and appear normal.  No ureteral dilation or calculus seen.    Pelvis: The urinary bladder appears normal.  No inguinal hernia or pelvic adenopathy found.    Bowel and mesentery: The terminal ileum appears normal axial image 112.  The appendix appears normal axial image 112.  No bowel dilation or wall thickening or pneumatosis or free air or abscess or free fluid found.  No mesenteric stranding.    Skeleton: Visualized femurs appear intact.  No osteonecrosis.  Sacrum and SI joints appear normal.  No rib fracture found.  No  compression deformity or subluxation of the visualized spine.  No endplate erosion.                               X-Ray Chest AP Portable (Final result)  Result time 07/31/19 15:00:25    Final result by Rod Carmen MD (07/31/19 15:00:25)                 Impression:      No acute cardiopulmonary abnormality.      Electronically signed by: Rod Carmen  Date:    07/31/2019  Time:    15:00             Narrative:    EXAMINATION:  XR CHEST AP PORTABLE    CLINICAL HISTORY:  Sepsis;    TECHNIQUE:  Single frontal view of the chest was performed.    COMPARISON:  04/26/2019    FINDINGS:  No lobar consolidation, pleural effusion, or pneumothorax.  Cardiomediastinal silhouette appears similar.  No acute osseous abnormality.  Stable exam from comparison study.                                   Assessment:     Flo Wayne is a 24 y.o. male with no pertinent past medical history who presents with 4 day history of nausea, vomiting, abdominal pain and watery diarrhea. Differential at this point is viral vs. bacterial gastroenteritis. At this point patient's fever is controlled on acetaminophen and he appears to be volume depleted 2/2 vomiting and diarrhea.     Plan:     Viral vs. Bacterial Gastroenteritis  -Patient febrile in ED to 102.9, tachycardic to 144, tachypnic to 25  -URI symptoms earlier this week before onset of abd pain, nausea and diarrhea  -Denies blood in diarrhea  -Hepatomegaly on exam  -CXR and abd CT scan negative for any acute process  -Heterophile Ab screen negative  -Lactate 1.6, trended to 0.5  -HIV pending  -BCx, stool cx, stool ova and parasites pending  -E Coli 0157 antigen pending  -Stool WBC + many neutrophils  -WBC in ED 13.35  -At this point etiology likely viral, will treat supportively with tylenol, ibuprofen, zofran    Mild Hypokalemia  -K 3.4 on admission, likely 2/2 vomiting and diarrhea  -40 units of K for potassium repletion    Dehydration 2/2 vomiting and diarrhea  -Patient states has  "not been able to drink water since Sunday  -Received NS bolus in ED consistent with sepsis protocol  -Admission Cr 1.3 with BUN 9--> <20:1 BUN/Cr ratio possible indicator of glomerular damage. Follow up tomorrow AM after fluid resucitation  -UA 2+ protein, 2+ ketones  -FeNa tomorrow AM  -Blood pressures stable at this time  -Will give maintenance fluids due to poor PO intake    Migraines  -No acute exacerbation  -PRN sumatriptan    Seizure disorder  -Well controlled, has not had a seizure in "years"  -Takes Lamictal for ppx, but has not taken it for six months since he moved to [place]    F: LR 100mL/hr  E: Mild hypokalemia, will replace. Others wnl  N: Regular diet    Dispo: pending adequate PO intake      Code Status:     Full    Juan Cevallos  [facility] Internal Medicine L4 Medical Student  [facility] Medicine Service Team A    [facility] Medicine Hospitalist Pager numbers:   [facility] Hospitalist Medicine Team A (Audrey/Mari): [phone]  [facility] Hospitalist Medicine Team B (Scotty/Erasmo):  [phone]        "

## 2019-08-01 LAB
ALBUMIN SERPL BCP-MCNC: 2.8 G/DL (ref 3.5–5.2)
ALP SERPL-CCNC: 77 U/L (ref 55–135)
ALT SERPL W/O P-5'-P-CCNC: 26 U/L (ref 10–44)
ANION GAP SERPL CALC-SCNC: 9 MMOL/L (ref 8–16)
AST SERPL-CCNC: 24 U/L (ref 10–40)
BASOPHILS # BLD AUTO: 0.02 K/UL (ref 0–0.2)
BASOPHILS NFR BLD: 0.3 % (ref 0–1.9)
BILIRUB SERPL-MCNC: 0.5 MG/DL (ref 0.1–1)
BUN SERPL-MCNC: 6 MG/DL (ref 6–20)
C DIFF GDH STL QL: NEGATIVE
C DIFF TOX A+B STL QL IA: NEGATIVE
CALCIUM SERPL-MCNC: 8.4 MG/DL (ref 8.7–10.5)
CHLORIDE SERPL-SCNC: 106 MMOL/L (ref 95–110)
CO2 SERPL-SCNC: 22 MMOL/L (ref 23–29)
CREAT SERPL-MCNC: 0.9 MG/DL (ref 0.5–1.4)
CREAT UR-MCNC: 60.2 MG/DL (ref 23–375)
DIFFERENTIAL METHOD: ABNORMAL
EOSINOPHIL # BLD AUTO: 0.1 K/UL (ref 0–0.5)
EOSINOPHIL NFR BLD: 0.7 % (ref 0–8)
ERYTHROCYTE [DISTWIDTH] IN BLOOD BY AUTOMATED COUNT: 13 % (ref 11.5–14.5)
EST. GFR  (AFRICAN AMERICAN): >60 ML/MIN/1.73 M^2
EST. GFR  (NON AFRICAN AMERICAN): >60 ML/MIN/1.73 M^2
ESTIMATED AVG GLUCOSE: 103 MG/DL (ref 68–131)
GLUCOSE SERPL-MCNC: 128 MG/DL (ref 70–110)
HBA1C MFR BLD HPLC: 5.2 % (ref 4–5.6)
HCT VFR BLD AUTO: 37.6 % (ref 40–54)
HGB BLD-MCNC: 12.2 G/DL (ref 14–18)
HIV 1+2 AB+HIV1 P24 AG SERPL QL IA: NEGATIVE
LYMPHOCYTES # BLD AUTO: 1 K/UL (ref 1–4.8)
LYMPHOCYTES NFR BLD: 12.8 % (ref 18–48)
MCH RBC QN AUTO: 27.9 PG (ref 27–31)
MCHC RBC AUTO-ENTMCNC: 32.4 G/DL (ref 32–36)
MCV RBC AUTO: 86 FL (ref 82–98)
MONOCYTES # BLD AUTO: 1.3 K/UL (ref 0.3–1)
MONOCYTES NFR BLD: 17.1 % (ref 4–15)
NEUTROPHILS # BLD AUTO: 5.1 K/UL (ref 1.8–7.7)
NEUTROPHILS NFR BLD: 69.1 % (ref 38–73)
O+P STL TRI STN: NORMAL
PLATELET # BLD AUTO: 208 K/UL (ref 150–350)
PMV BLD AUTO: 9.2 FL (ref 9.2–12.9)
POTASSIUM SERPL-SCNC: 3.4 MMOL/L (ref 3.5–5.1)
PROT SERPL-MCNC: 5.8 G/DL (ref 6–8.4)
RBC # BLD AUTO: 4.38 M/UL (ref 4.6–6.2)
SODIUM SERPL-SCNC: 137 MMOL/L (ref 136–145)
SODIUM UR-SCNC: 172 MMOL/L (ref 20–250)
WBC # BLD AUTO: 7.48 K/UL (ref 3.9–12.7)

## 2019-08-01 PROCEDURE — 84300 ASSAY OF URINE SODIUM: CPT

## 2019-08-01 PROCEDURE — 96376 TX/PRO/DX INJ SAME DRUG ADON: CPT

## 2019-08-01 PROCEDURE — 80053 COMPREHEN METABOLIC PANEL: CPT

## 2019-08-01 PROCEDURE — 96375 TX/PRO/DX INJ NEW DRUG ADDON: CPT

## 2019-08-01 PROCEDURE — 87449 NOS EACH ORGANISM AG IA: CPT

## 2019-08-01 PROCEDURE — 82570 ASSAY OF URINE CREATININE: CPT

## 2019-08-01 PROCEDURE — G0378 HOSPITAL OBSERVATION PER HR: HCPCS

## 2019-08-01 PROCEDURE — 94761 N-INVAS EAR/PLS OXIMETRY MLT: CPT

## 2019-08-01 PROCEDURE — 36415 COLL VENOUS BLD VENIPUNCTURE: CPT

## 2019-08-01 PROCEDURE — 25000003 PHARM REV CODE 250: Performed by: STUDENT IN AN ORGANIZED HEALTH CARE EDUCATION/TRAINING PROGRAM

## 2019-08-01 PROCEDURE — 85025 COMPLETE CBC W/AUTO DIFF WBC: CPT

## 2019-08-01 PROCEDURE — 63600175 PHARM REV CODE 636 W HCPCS: Performed by: STUDENT IN AN ORGANIZED HEALTH CARE EDUCATION/TRAINING PROGRAM

## 2019-08-01 RX ORDER — ONDANSETRON 2 MG/ML
8 INJECTION INTRAMUSCULAR; INTRAVENOUS
Status: DISCONTINUED | OUTPATIENT
Start: 2019-08-01 | End: 2019-08-02 | Stop reason: HOSPADM

## 2019-08-01 RX ORDER — MAGNESIUM SULFATE HEPTAHYDRATE 40 MG/ML
2 INJECTION, SOLUTION INTRAVENOUS ONCE
Status: COMPLETED | OUTPATIENT
Start: 2019-08-01 | End: 2019-08-01

## 2019-08-01 RX ORDER — ONDANSETRON HCL IN 0.9 % NACL 8 MG/50 ML
8 INTRAVENOUS SOLUTION, PIGGYBACK (ML) INTRAVENOUS
Status: CANCELLED | OUTPATIENT
Start: 2019-08-01

## 2019-08-01 RX ORDER — POTASSIUM CHLORIDE 20 MEQ/1
20 TABLET, EXTENDED RELEASE ORAL
Status: CANCELLED | OUTPATIENT
Start: 2019-08-01 | End: 2019-08-01

## 2019-08-01 RX ORDER — POTASSIUM CHLORIDE 20 MEQ/1
20 TABLET, EXTENDED RELEASE ORAL
Status: COMPLETED | OUTPATIENT
Start: 2019-08-01 | End: 2019-08-01

## 2019-08-01 RX ORDER — BUTALBITAL, ACETAMINOPHEN AND CAFFEINE 50; 325; 40 MG/1; MG/1; MG/1
1 TABLET ORAL EVERY 4 HOURS PRN
Status: CANCELLED | OUTPATIENT
Start: 2019-08-01

## 2019-08-01 RX ORDER — TRAMADOL HYDROCHLORIDE AND ACETAMINOPHEN 37.5; 325 MG/1; MG/1
1 TABLET, FILM COATED ORAL EVERY 4 HOURS PRN
Status: DISCONTINUED | OUTPATIENT
Start: 2019-08-01 | End: 2019-08-02 | Stop reason: HOSPADM

## 2019-08-01 RX ORDER — ONDANSETRON 2 MG/ML
4 INJECTION INTRAMUSCULAR; INTRAVENOUS EVERY 6 HOURS PRN
Status: DISCONTINUED | OUTPATIENT
Start: 2019-08-01 | End: 2019-08-01

## 2019-08-01 RX ADMIN — TRAMADOL HYDROCHLORIDE AND ACETAMINOPHEN 1 TABLET: 37.5; 325 TABLET ORAL at 04:08

## 2019-08-01 RX ADMIN — ONDANSETRON 8 MG: 2 INJECTION INTRAMUSCULAR; INTRAVENOUS at 05:08

## 2019-08-01 RX ADMIN — MAGNESIUM SULFATE IN WATER 2 G: 40 INJECTION, SOLUTION INTRAVENOUS at 04:08

## 2019-08-01 RX ADMIN — POTASSIUM CHLORIDE 20 MEQ: 20 TABLET, EXTENDED RELEASE ORAL at 04:08

## 2019-08-01 RX ADMIN — TRAMADOL HYDROCHLORIDE AND ACETAMINOPHEN 1 TABLET: 37.5; 325 TABLET ORAL at 09:08

## 2019-08-01 RX ADMIN — POTASSIUM CHLORIDE 20 MEQ: 20 TABLET, EXTENDED RELEASE ORAL at 08:08

## 2019-08-01 RX ADMIN — POTASSIUM CHLORIDE 20 MEQ: 20 TABLET, EXTENDED RELEASE ORAL at 06:08

## 2019-08-01 RX ADMIN — POTASSIUM CHLORIDE 20 MEQ: 20 TABLET, EXTENDED RELEASE ORAL at 12:08

## 2019-08-01 RX ADMIN — ONDANSETRON 4 MG: 2 INJECTION INTRAMUSCULAR; INTRAVENOUS at 05:08

## 2019-08-01 NOTE — PLAN OF CARE
Problem: Adult Inpatient Plan of Care  Goal: Plan of Care Review  Outcome: Ongoing (interventions implemented as appropriate)  Patient is resting and AAOx4. Medications given per orders in MAR. PRN pain medication was obtained for headache, orders were put in; documentation can be found in Notes. SCDs in place and on. Urinal provided and bedside to obtain urine sample, waiting for sample. VSS. Patient had 3+ bowel movements during shift, documentation can be found in Flowsheets. No complaints of N/V during shift. Safety maintained, bed alarm on, remained free from falls. Instructed patient to all about concerns, needs, or assistance. Will continue to monitor.

## 2019-08-01 NOTE — PROGRESS NOTES
"[facility] Hospital Medicine Progress Note    Primary Team: [facility] Hospitalist Team A  Attending Physician: Audrey  Resident: Arley  Intern: Albina    Subjective:      Mr. Wayne still reports feeling nauseous and having abdominal pain this morning. He was also still having watery diarrhea and subjective fevers all night. He is still having difficulty with PO intake due to constant vomitting but is willing to try a full liquid diet.     Objective:     Last 24 Hour Vital Signs:  BP  Min: 105/56  Max: 134/80  Temp  Av.9 °F (37.7 °C)  Min: 98.6 °F (37 °C)  Max: 102.9 °F (39.4 °C)  Pulse  Av.6  Min: 95  Max: 144  Resp  Av.5  Min: 18  Max: 35  SpO2  Av %  Min: 96 %  Max: 99 %  Height  Av' 9" (175.3 cm)  Min: 5' 9" (175.3 cm)  Max: 5' 9" (175.3 cm)  Weight  Av.5 kg (199 lb 9.3 oz)  Min: 89.4 kg (197 lb)  Max: 92.7 kg (204 lb 5.9 oz)  I/O last 3 completed shifts:  In: .7 [P.O.:600; I.V.:721.7; IV Piggyback:700]  Out: 50 [Urine:50]    Physical Examination:  Gen: AAOx3, appears diaphoretic and in mild distress  HEENT: head normocephalic, atraumatic  Cardiac: regular rate and rhythm; no murmurs, rubs, or gallops  Resp: clear to auscultation bilaterally, normal work of breathing  GI: abdomen diffusely tender to palpation, most notably in the lower quadrants; soft, non-distended; normoactive bowel sounds  Extrem: no clubbing or swelling noted  Skin: no rashes or bruises noted  Neuro: AAOx3, moving all extremities without difficulty       Laboratory:  Laboratory Data Reviewed: yes  Pertinent Findings:  CMP  Sodium   Date Value Ref Range Status   2019 137 136 - 145 mmol/L Final     Potassium   Date Value Ref Range Status   2019 3.4 (L) 3.5 - 5.1 mmol/L Final     Chloride   Date Value Ref Range Status   2019 106 95 - 110 mmol/L Final     CO2   Date Value Ref Range Status   2019 22 (L) 23 - 29 mmol/L Final     Glucose   Date Value Ref Range Status   2019 128 (H) 70 - 110 mg/dL Final "     BUN, Bld   Date Value Ref Range Status   08/01/2019 6 6 - 20 mg/dL Final     Creatinine   Date Value Ref Range Status   08/01/2019 0.9 0.5 - 1.4 mg/dL Final     Calcium   Date Value Ref Range Status   08/01/2019 8.4 (L) 8.7 - 10.5 mg/dL Final     Total Protein   Date Value Ref Range Status   08/01/2019 5.8 (L) 6.0 - 8.4 g/dL Final     Albumin   Date Value Ref Range Status   08/01/2019 2.8 (L) 3.5 - 5.2 g/dL Final     Total Bilirubin   Date Value Ref Range Status   08/01/2019 0.5 0.1 - 1.0 mg/dL Final     Comment:     For infants and newborns, interpretation of results should be based  on gestational age, weight and in agreement with clinical  observations.  Premature Infant recommended reference ranges:  Up to 24 hours.............<8.0 mg/dL  Up to 48 hours............<12.0 mg/dL  3-5 days..................<15.0 mg/dL  6-29 days.................<15.0 mg/dL       Alkaline Phosphatase   Date Value Ref Range Status   08/01/2019 77 55 - 135 U/L Final     AST   Date Value Ref Range Status   08/01/2019 24 10 - 40 U/L Final     ALT   Date Value Ref Range Status   08/01/2019 26 10 - 44 U/L Final     Anion Gap   Date Value Ref Range Status   08/01/2019 9 8 - 16 mmol/L Final     eGFR if    Date Value Ref Range Status   08/01/2019 >60 >60 mL/min/1.73 m^2 Final     eGFR if non    Date Value Ref Range Status   08/01/2019 >60 >60 mL/min/1.73 m^2 Final     Comment:     Calculation used to obtain the estimated glomerular filtration  rate (eGFR) is the CKD-EPI equation.        Lab Results   Component Value Date    WBC 7.48 08/01/2019    HGB 12.2 (L) 08/01/2019    HCT 37.6 (L) 08/01/2019    MCV 86 08/01/2019     08/01/2019         Microbiology Data Reviewed: yes  Pertinent Findings:  C diff pending  Blood cultures NGTD  Strep culture NG  E coli 0157 antigen negative  Stool culture pending  Flu A and B negative  HIV negative  Heterophile antibody negative  Stool ova and parasites  "negative    Other Results:  EKG (my interpretation): none    Radiology Data Reviewed: yes  Pertinent Findings:  none    Current Medications:     Infusions: none       Scheduled: mag sulfate 2g once, ondansetron IV 8 mg TID, potassium chloride tab 40 mg       PRN:  acetaminophen, Dextrose 10% Bolus, Dextrose 10% Bolus, dicyclomine, glucagon (human recombinant), glucose, glucose, ondansetron, sodium chloride 0.9%    Antibiotics and Day Number of Therapy:  none    Lines and Day Number of Therapy:  PIV    Assessment:     Flo Wayne is a 24 y.o.male with  Patient Active Problem List    Diagnosis Date Noted    Dehydration     Viral gastroenteritis 07/31/2019    Migraine 07/31/2019    Seizure disorder 07/31/2019    Hypokalemia 07/31/2019        Plan:     Viral vs Bacterial Gastroenteritis  - on admission febrile 102.9, , RR 25, WBC 13.35  - this morning febrile 101.3, , RR 22, WBC 7.48  - URI symptoms preceding onset of nausea and diarrhea  - CT negative for inflammatory process  - infectious work up negative thus far, c diff pending  - Procal ordered  - supportive care, likely viral, scheduled zofran 8mg TID and dicyclomine and ultracet PRN  - advance diet as tolerated    Hypokalemia  - 3.4 on admission and today  - likely 2/2 vomiting and diarrhea  - checking magnesium  - will continue to replace as needed    Dehydration  - 2/2 vomiting and diarrhea  - still has poor PO intake  - will wean off fluids as PO intake increases    Anemia  - Hg 15.6 on admission, 12.2 today  - likely dilutional  - Iron studies, B12, folate in process    Migraine  - not currently experiencing symptoms    Seizure Disorder  - hasn't had a seizure in "years"  - prescribed lamictal but hasn't taken it in months    Healthcare maintenance  -UTD on Tdap, Flu  -Needs PCP    Nutrition: CLD advance as tolerated to regular  PPx: SCD  Code status: Full    Dispo: likely d/c in am as long as can tolerate PO intake with PCP follow up in 1-2 " days    Sabina Montemayor MD  [facility] Internal Medicine HO-I    [facility] Medicine Hospitalist Pager numbers:   [facility] Hospitalist Medicine Team A (Audrey/Mari): [phone]  [facility] Hospitalist Medicine Team B (Scotty/Erasmo):  [phone]

## 2019-08-01 NOTE — PROGRESS NOTES
.Pharmacy New Medication Education    Patient and/or Caregiver ACCEPTED medication education.    Pharmacy has provided education on the name, indication, and possible side effects of the medication(s) prescribed, using teach-back method.     Learners of pharmacy medication education includes:  patient    Medication Indication Side Effects   ondansetron Nausea/vomiting sleepiness   acetaminophen pain Liver dysfunction          The following medications have also been discussed, during this admission.     Current Facility-Administered Medications   Medication Frequency    acetaminophen tablet 650 mg Q6H PRN    dextrose 10% (D10W) Bolus PRN    dextrose 10% (D10W) Bolus PRN    dicyclomine capsule 10 mg QID PRN    glucagon (human recombinant) injection 1 mg PRN    glucose chewable tablet 16 g PRN    glucose chewable tablet 24 g PRN    ondansetron injection 4 mg Q6H PRN    sodium chloride 0.9% flush 10 mL PRN            Thank you  Nani Holland, PharmD

## 2019-08-01 NOTE — H&P
"[facility] Medicine H&P Note     Admitting Team: [facility] Hospitalist Team A  Attending Physician: Audrey  Resident: Arley  Intern: Albina    Date of Admit: 7/31/2019    Chief Complaint     Abdominal pain x2 days    Subjective:      History of Present Illness:  Flo Wayne is a 24 y.o. male with a history of seizure disorder and migraines who presented to [facility] on 7/31/2019 with a primary complaint of abdominal pain x2 days.    Patient states that he first started feeling abnormal on Sunday 7/28 when he began to have a sore throat. The next day, the throat pain became much worse, stating it was difficult to swallow food and water, and he started having additionally "cold flashes," nausea, vomiting, headache, watery diarrhea and stabbing, intermittent, LLQ abdominal pain. When asked to quantify how many episodes of vomiting and diarrhea, he states it was too many to count. Denied noticing any blood in either vomitus or diarrhea. Patient continued to have constant episodes of vomiting and diarrhea making it unable to keep down fluids. Went to the ED on Monday where he was diagnosed with viral gastroenteritis, given fluids, and told to return if symptoms did not improve. Tuesday the symptoms continued to deteriorate, and patient presented to the ED again today 7/31.     States that this has never happened to him before, stating that he has had stomach problems in the past with diarrhea but never with this acuity or severity. Reports he had multiple colonoscopies as a child for GI issues that only showed a few polyps.  Denies any recent intake of raw shellfish, recent travel history, or drinking from freshwater streams/lakes.     Patient is new to our system having moved from [place] in February of this year.    Past Medical History:  Migraines, seizure disorder (hasn't had seizure in years, has not taken lamictal since February)  Pneumonia last year requiring ventilatory assistance in [place]. No " "residual deficits.    Past Surgical History:  History reviewed. No pertinent surgical history.    Allergies:  Review of patient's allergies indicates:  No Known Allergies    Home Medications:  Previously taking lamictal (unknown dose; none since February)  Sumatriptan PRN    Family History:  Mother with CHF, HLD, HTN, DM2  Sister with spina bifida ()    Social History:  Social History     Tobacco Use    Smoking status: Never Smoker    Smokeless tobacco: Never Used   Substance Use Topics    Alcohol use: Never     Frequency: Never    Drug use: Never     Review of Systems:  Pertinent items are noted in HPI. All other systems are reviewed and are negative.    Health Maintaince :   Primary Care Physician: None    Immunizations:   TDap is UTD   Flu is UTD  Pna not indicated    Cancer Screening:  Colonoscopy: not indicated     Objective:   Last 24 Hour Vital Signs:  BP  Min: 105/56  Max: 131/78  Temp  Av.8 °F (37.7 °C)  Min: 98.6 °F (37 °C)  Max: 102.9 °F (39.4 °C)  Pulse  Av.8  Min: 101  Max: 144  Resp  Av.2  Min: 18  Max: 35  SpO2  Av.3 %  Min: 98 %  Max: 99 %  Height  Av' 9" (175.3 cm)  Min: 5' 9" (175.3 cm)  Max: 5' 9" (175.3 cm)  Weight  Av.8 kg (197 lb 15.8 oz)  Min: 89.4 kg (197 lb)  Max: 90.7 kg (199 lb 15.3 oz)  Body mass index is 29.53 kg/m².  I/O last 3 completed shifts:  In: 700 [IV Piggyback:700]  Out: -     Physical Examination:  Gen: AAOx3, NAD  HEENT: head normocephalic, atraumatic; PERRL; EOMI; trachea midline  Cardiac: regular rate and rhythm; no murmurs, rubs, or gallops  Resp: clear to auscultation bilaterally; no wheezing; normal work of breathing  GI: abdomen soft, TTP diffusely but mostly lower abdomen, non-distended; normoactive bowel sounds  Extrem: no clubbing or swelling noted  Skin: no rashes or bruises noted  Neuro: AAOx3, CN III-VII, IX-XII intact; strength 5/5 in all extremities; sensation intact to light touch in all extremities   "     Laboratory:  Most Recent Data:  CBC:   Lab Results   Component Value Date    WBC 13.35 (H) 07/31/2019    HGB 15.6 07/31/2019    HCT 47.2 07/31/2019     07/31/2019    MCV 85 07/31/2019    RDW 12.9 07/31/2019     WBC Differential: 85 % N, 0 % Bands, 7 % L, 7 % M, 0 % Eo, 0 % Baso, no additional cells seen  BMP:   Lab Results   Component Value Date     07/31/2019    K 3.4 (L) 07/31/2019     07/31/2019    CO2 25 07/31/2019    BUN 9 07/31/2019    CREATININE 1.3 07/31/2019     07/31/2019    CALCIUM 9.5 07/31/2019     LFTs:   Lab Results   Component Value Date    PROT 7.5 07/31/2019    ALBUMIN 3.8 07/31/2019    BILITOT 0.9 07/31/2019    AST 18 07/31/2019    ALKPHOS 95 07/31/2019    ALT 30 07/31/2019     Coags: No results found for: INR, PROTIME, PTT  FLP: No results found for: CHOL, HDL, LDLCALC, TRIG, CHOLHDL  DM:   Lab Results   Component Value Date    HGBA1C 5.2 07/31/2019    CREATININE 1.3 07/31/2019     Thyroid: No results found for: TSH, FREET4, I5WMHFN, I3CHLKG, THYROIDAB  Anemia: No results found for: IRON, TIBC, FERRITIN, CWYIBRGI72, FOLATE  Cardiac: No results found for: TROPONINI, CKTOTAL, CKMB, BNP  Urinalysis:   Lab Results   Component Value Date    COLORU Yellow 07/31/2019    SPECGRAV 1.015 07/31/2019    NITRITE Negative 07/31/2019    KETONESU 2+ (A) 07/31/2019    UROBILINOGEN Negative 07/31/2019    WBCUA 5 07/31/2019       Trended Lab Data:  Recent Labs   Lab 07/31/19  1421   WBC 13.35*   HGB 15.6   HCT 47.2      MCV 85   RDW 12.9      K 3.4*      CO2 25   BUN 9   CREATININE 1.3      PROT 7.5   ALBUMIN 3.8   BILITOT 0.9   AST 18   ALKPHOS 95   ALT 30       Trended Cardiac Data:  No results for input(s): TROPONINI, CKTOTAL, CKMB, BNP in the last 168 hours.    Microbiology Data:  Rapid strep negative  Blood cultures no growth to date  Fecal WBC positive  E coli 0157 antigen in process  Stool  Culture inprocess    Other Results:  EKG (my interpretation):  sinus tachycardia; right atrial enlargement    Radiology:  Imaging Results          CT Abdomen Pelvis With Contrast (Final result)  Result time 07/31/19 16:39:32    Final result by Gerson Chopra MD (07/31/19 16:39:32)                 Impression:      Normal appendix.  No urolithiasis or hydronephrosis.  No bowel dilation or inflammatory process.      Electronically signed by: Gerson Chopra  Date:    07/31/2019  Time:    16:39             Narrative:    EXAMINATION:  CT ABDOMEN PELVIS WITH CONTRAST    CLINICAL HISTORY:  Nausea, vomiting, diarrhea;    TECHNIQUE:  Low dose axial images, sagittal and coronal reformations were obtained from the lung bases to the pubic symphysis following the IV administration of 100 mL of Omnipaque 350 without oral contrast.    COMPARISON:  None.    FINDINGS:  Chest: Visualized heart appears normal.  No pericardial effusion.  The visualized esophagus and lungs are normal.  No pneumothorax or pleural effusion or interstitial edema.    Abdomen: The liver, spleen, pancreas, adrenal glands, gallbladder appear normal.  The hepatic veins and portal veins and SMV and splenic vein show normal enhancement.    The abdominal aorta and IVC appear normal.  No periaortic adenopathy found.  Kidneys enhance symmetrically and appear normal.  No ureteral dilation or calculus seen.    Pelvis: The urinary bladder appears normal.  No inguinal hernia or pelvic adenopathy found.    Bowel and mesentery: The terminal ileum appears normal axial image 112.  The appendix appears normal axial image 112.  No bowel dilation or wall thickening or pneumatosis or free air or abscess or free fluid found.  No mesenteric stranding.    Skeleton: Visualized femurs appear intact.  No osteonecrosis.  Sacrum and SI joints appear normal.  No rib fracture found.  No compression deformity or subluxation of the visualized spine.  No endplate erosion.                               X-Ray Chest AP Portable (Final result)  Result  time 07/31/19 15:00:25    Final result by Rod Carmen MD (07/31/19 15:00:25)                 Impression:      No acute cardiopulmonary abnormality.      Electronically signed by: Rod Carmen  Date:    07/31/2019  Time:    15:00             Narrative:    EXAMINATION:  XR CHEST AP PORTABLE    CLINICAL HISTORY:  Sepsis;    TECHNIQUE:  Single frontal view of the chest was performed.    COMPARISON:  04/26/2019    FINDINGS:  No lobar consolidation, pleural effusion, or pneumothorax.  Cardiomediastinal silhouette appears similar.  No acute osseous abnormality.  Stable exam from comparison study.                                 Assessment:     Flo Wayne is a 24 y.o. male with:  Patient Active Problem List    Diagnosis Date Noted    Viral gastroenteritis 07/31/2019    Migraine 07/31/2019    Seizure disorder 07/31/2019    Hypokalemia 07/31/2019        Plan:     Sepsis 2/2 likely viral gastroenteritis  -Patient febrile in ED to 102.9, tachycardic to 144, tachypnic to 25, WBC 13.35  -URI symptoms earlier this week before onset of abd pain, nausea and diarrhea  -CXR and abd CT scan negative for any acute process  -Heterophile Ab screen negative  -Lactate 1.6, trended to 0.5  -HIV pending  -BCx, stool cx, stool ova and parasites pending  -E Coli 0157 antigen pending  -Stool WBC + many neutrophils  -At this point etiology likely viral, will treat supportively with tylenol, zofran     Mild Hypokalemia  -K 3.4 on admission, likely 2/2 vomiting and diarrhea and decreased PO intake  -40 units of K for potassium repletion     NOVA 2/2 vomiting and diarrhea  -Patient states has not been able to drink water since Sunday  -Received NS bolus in ED consistent with sepsis protocol  -Admission Cr 1.3 with BUN 9--> <20:1 BUN/Cr ratio possible indicator of glomerular damage. Follow up tomorrow AM after fluid resucitation  -UA 2+ protein, 2+ ketones  -FeNa tomorrow AM  -Blood pressures stable at this time  -Will give  "maintenance fluids due to poor PO intake     Migraines  -No acute exacerbation  -Can add PRN sumatriptan     Seizure disorder  -Well controlled, has not had a seizure in "years"  -Takes Lamictal for ppx, but has not taken it for six months since he moved to [place]     F: LR 100mL/hr  E: Mild hypokalemia, will replace. Others wnl  N: Regular diet     Dispo: pending adequate PO intake, likely discharge tomorrow    Juan GRANDA Medical Student    AND    Kevin Tubbs MD  \[facility]\"" Internal Medicine -II    \[facility]\"" Medicine Hospitalist Pager numbers:   \[facility]\"" Hospitalist Medicine Team A (Audrey/Mari): [phone]  \[facility]\"" Hospitalist Medicine Team B (Scotty/Erasmo):  [phone]          "

## 2019-08-01 NOTE — NURSING
Patient arrived to floor; VSS; TEAM was notified about patient having headache with a rating of 8. Orders were put in for tylenol. Will continue to monitor.

## 2019-08-01 NOTE — PLAN OF CARE
Problem: Adult Inpatient Plan of Care  Goal: Plan of Care Review  Outcome: Ongoing (interventions implemented as appropriate)  Admission complete.  Initiated care plan and education.  Chart review done.

## 2019-08-01 NOTE — MEDICAL/APP STUDENT
"[facility] Internal Medicine Team A-L4 Medical Student Progress Note    Subjective:      Flo Wayne is a 24 y.o. male who is being followed by the [facility] Internal [facility] service at [facility] for likely viral gastroenteritis with fever, sore throat, nausea, vomiting, diarrhea, and diffuse abdominal pain.    This morning patient continues to feel poorly--states he is "hitting a brick wall." Endorses fever, constant nausea and vomiting and diarrhea overnight which kept him up the entire night. States that he still cannot take anything by mouth due to vomiting.      Objective:   Last 24 Hour Vital Signs:  BP  Min: 105/56  Max: 132/73  Temp  Av.9 °F (37.7 °C)  Min: 98.6 °F (37 °C)  Max: 102.9 °F (39.4 °C)  Pulse  Av.5  Min: 95  Max: 144  Resp  Av.8  Min: 18  Max: 35  SpO2  Av.3 %  Min: 98 %  Max: 99 %  Height  Av' 9" (175.3 cm)  Min: 5' 9" (175.3 cm)  Max: 5' 9" (175.3 cm)  Weight  Av.5 kg (199 lb 9.3 oz)  Min: 89.4 kg (197 lb)  Max: 92.7 kg (204 lb 5.9 oz)  I/O last 3 completed shifts:  In: .7 [P.O.:600; I.V.:721.7; IV Piggyback:700]  Out: 50 [Urine:50]    Physical Examination:  General:          Alert and awake, sitting in the bed with vomit bag  Head:               Normocephalic and atraumatic  Neck:               Submandibular lymphadenopathy present bilaterally  Eyes:               PERRL; EOMi with anicteric sclera and clear conjunctivae  Mouth:             Oropharynx clear and without exudate, some pharyngeal erythema appreciated; moist mucous membranes  Cardio:             Regular rate and rhythm with normal S1 and S2; no murmurs or rubs  Resp:               CTAB and unlabored; no wheezes, crackles or rhonchi  Abdom:            Soft, normoactive bowel sounds, tender to palpation diffusely but most pronounced in LLQ and RLQ. Liver span                  3cm below costal margin  Extrem:            WWP with no clubbing, cyanosis or edema  Skin:                No rashes, " lesions, or color changes, cap refill <2 secs  Pulses:            2+ and symmetric distally  Neuro:             AAOx3; cooperative and pleasant with no focal deficits    Laboratory:  Laboratory Data Reviewed: yes  Pertinent Findings:  Recent Labs   Lab 07/31/19  1421 08/01/19  0748   WBC 13.35* 7.48   HGB 15.6 12.2*   HCT 47.2 37.6*    208    137   K 3.4* 3.4*    106   CREATININE 1.3 0.9   BUN 9 6   CO2 25 22*   ALT 30 26   AST 18 24       Microbiology Data Reviewed: yes  Pertinent Findings:  Microbiology Results (last 7 days)     Procedure Component Value Units Date/Time    Blood culture x two cultures. Draw prior to antibiotics. [[id number]] Collected:  07/31/19 1410    Order Status:  Completed Specimen:  Blood from Peripheral, Antecubital, Right Updated:  08/01/19 0115     Blood Culture, Routine No Growth to date    Narrative:       Aerobic and anaerobic  right AC    Blood culture x two cultures. Draw prior to antibiotics. [[id number]] Collected:  07/31/19 1420    Order Status:  Completed Specimen:  Blood from Peripheral, Antecubital, Left Updated:  08/01/19 0115     Blood Culture, Routine No Growth to date    Narrative:       Aerobic and anaerobic  right hand    Stool culture **CANNOT BE ORDERED STAT** [[id number]] Collected:  07/31/19 1805    Order Status:  Sent Specimen:  Stool Updated:  07/31/19 2208    E. coli 0157 antigen [[id number]] Collected:  07/31/19 1805    Order Status:  No result Specimen:  Stool Updated:  07/31/19 2208    Strep A culture, throat [[id number]] Collected:  07/31/19 1452    Order Status:  No result Specimen:  Throat Updated:  07/31/19 1921    Influenza A & B by Molecular [[id number]] Collected:  07/31/19 1525    Order Status:  Completed Specimen:  Nasopharyngeal Swab Updated:  07/31/19 1613     Influenza A, Molecular Negative     Influenza B, Molecular Negative     Flu A & B Source Nasal swab    Rapid strep screen [[id number]] Collected:  07/31/19 1452    Order Status:   Completed Specimen:  Throat Updated:  07/31/19 1506     Rapid Strep A Screen Negative     Comment: See Micro for reflexed Strep culture.             Other Results:  Radiology Data Reviewed: yes  Pertinent Findings:  CXR and CT abdomen negative for acute cardiopulmonary abnormalities, appendicitis, urolithiasis or hydronephrosis, or bowel dilation or inflammatory process    Current Medications:     Infusions:       Scheduled:       PRN:  acetaminophen, Dextrose 10% Bolus, Dextrose 10% Bolus, dicyclomine, glucagon (human recombinant), glucose, glucose, ondansetron, sodium chloride 0.9%    Antibiotics and Day Number of Therapy:  None    Lines and Day Number of Therapy:  PIV x1    Assessment:     Flo Wayne is a 24 y.o.male with no pertinent past medical history who presents with 4 day history of nausea, vomiting, abdominal pain and watery diarrhea. Differential at this point is viral vs. bacterial gastroenteritis. Pending labs, will treat supportively and work on dehydration and controlling nausea and fever.    Patient Active Problem List    Diagnosis Date Noted    Viral gastroenteritis 07/31/2019    Migraine 07/31/2019    Seizure disorder 07/31/2019    Hypokalemia 07/31/2019        Plan:     Viral vs. Bacterial Gastroenteritis  -Patient febrile in ED to 102.9, tachycardic to 144, tachypnic to 25  -URI symptoms earlier this week before onset of abd pain, nausea and diarrhea  -Denies blood in diarrhea  -Hepatomegaly on exam  -CXR and abd CT scan negative for any acute process  -Heterophile Ab screen negative  -Lactate 1.6, trended to 0.5  -HIV pending  -BCx, stool cx, stool ova and parasites pending  -E Coli 0157 antigen pending  -Stool WBC + many neutrophils  -WBC in ED 13.35-->on morning 8/1 decreased to 7.48  -Spiked fever this morning to 101.3, tachycardic to 112 and respiratory rate 22, pressure 132/73  -At this point etiology likely viral, will treat supportively with tylenol, ibuprofen, zofran     Mild  "Hypokalemia  -K 3.4 on admission, likely 2/2 vomiting and diarrhea  -40 units of K for potassium repletion night of 7/31  -8/1 morning continues to be 3.4, likely due to GI losses  -Will replace again today with 60 units     Dehydration 2/2 vomiting and diarrhea  -Patient states has not been able to drink water since Sunday  -Received NS bolus in ED consistent with sepsis protocol  -Admission Cr 1.3 with BUN 9--> <20:1 BUN/Cr ratio possible indicator of glomerular damage. Follow up tomorrow AM after fluid resucitation  -UA 2+ protein, 2+ ketones  -FeNa this morning 1.9%  -Blood pressures stable at this time  -Will give maintenance fluids due to poor PO intake, LR at 100ml/hr     Migraines  -No acute exacerbation  -PRN sumatriptan     Seizure disorder  -Well controlled, has not had a seizure in "years"  -Takes Lamictal for ppx, but has not taken it for six months since he moved to [place]     F: LR 100mL/hr  E: Mild hypokalemia, will replace. Others wnl  N: Regular diet     Dispo: pending adequate PO intake    Juan Cevallos  [facility] Internal Medicine L4 Medical Student  U IM Service Team A    [facility] Medicine Hospitalist Pager numbers:   [facility] Hospitalist Medicine Team A (Audrey/Mari): [phone]  [facility] Hospitalist Medicine Team B (Scotty/Erasmo):  [phone]    "

## 2019-08-02 VITALS
TEMPERATURE: 99 F | DIASTOLIC BLOOD PRESSURE: 80 MMHG | RESPIRATION RATE: 18 BRPM | HEIGHT: 69 IN | HEART RATE: 109 BPM | BODY MASS INDEX: 28.9 KG/M2 | WEIGHT: 195.13 LBS | SYSTOLIC BLOOD PRESSURE: 125 MMHG | OXYGEN SATURATION: 97 %

## 2019-08-02 LAB
ALBUMIN SERPL BCP-MCNC: 3.3 G/DL (ref 3.5–5.2)
ALP SERPL-CCNC: 92 U/L (ref 55–135)
ALT SERPL W/O P-5'-P-CCNC: 29 U/L (ref 10–44)
ANION GAP SERPL CALC-SCNC: 13 MMOL/L (ref 8–16)
AST SERPL-CCNC: 17 U/L (ref 10–40)
BASOPHILS # BLD AUTO: 0.03 K/UL (ref 0–0.2)
BASOPHILS NFR BLD: 0.3 % (ref 0–1.9)
BILIRUB SERPL-MCNC: 0.5 MG/DL (ref 0.1–1)
BUN SERPL-MCNC: 6 MG/DL (ref 6–20)
CALCIUM SERPL-MCNC: 9.2 MG/DL (ref 8.7–10.5)
CHLORIDE SERPL-SCNC: 102 MMOL/L (ref 95–110)
CO2 SERPL-SCNC: 25 MMOL/L (ref 23–29)
CREAT SERPL-MCNC: 1 MG/DL (ref 0.5–1.4)
DIFFERENTIAL METHOD: ABNORMAL
E COLI SXT1 STL QL IA: NEGATIVE
E COLI SXT2 STL QL IA: NEGATIVE
EOSINOPHIL # BLD AUTO: 0.1 K/UL (ref 0–0.5)
EOSINOPHIL NFR BLD: 1 % (ref 0–8)
ERYTHROCYTE [DISTWIDTH] IN BLOOD BY AUTOMATED COUNT: 13.1 % (ref 11.5–14.5)
EST. GFR  (AFRICAN AMERICAN): >60 ML/MIN/1.73 M^2
EST. GFR  (NON AFRICAN AMERICAN): >60 ML/MIN/1.73 M^2
FERRITIN SERPL-MCNC: 315 NG/ML (ref 20–300)
FOLATE SERPL-MCNC: 10.8 NG/ML (ref 4–24)
GLUCOSE SERPL-MCNC: 90 MG/DL (ref 70–110)
HCT VFR BLD AUTO: 43.3 % (ref 40–54)
HGB BLD-MCNC: 14 G/DL (ref 14–18)
IRON SERPL-MCNC: 26 UG/DL (ref 45–160)
LYMPHOCYTES # BLD AUTO: 1.2 K/UL (ref 1–4.8)
LYMPHOCYTES NFR BLD: 13.8 % (ref 18–48)
MAGNESIUM SERPL-MCNC: 2.1 MG/DL (ref 1.6–2.6)
MCH RBC QN AUTO: 27.6 PG (ref 27–31)
MCHC RBC AUTO-ENTMCNC: 32.3 G/DL (ref 32–36)
MCV RBC AUTO: 85 FL (ref 82–98)
MONOCYTES # BLD AUTO: 1.1 K/UL (ref 0.3–1)
MONOCYTES NFR BLD: 13 % (ref 4–15)
NEUTROPHILS # BLD AUTO: 6.2 K/UL (ref 1.8–7.7)
NEUTROPHILS NFR BLD: 71.9 % (ref 38–73)
PLATELET # BLD AUTO: 256 K/UL (ref 150–350)
PMV BLD AUTO: 9.6 FL (ref 9.2–12.9)
POTASSIUM SERPL-SCNC: 3.7 MMOL/L (ref 3.5–5.1)
PROCALCITONIN SERPL IA-MCNC: 0.27 NG/ML
PROT SERPL-MCNC: 6.9 G/DL (ref 6–8.4)
RBC # BLD AUTO: 5.07 M/UL (ref 4.6–6.2)
SATURATED IRON: 8 % (ref 20–50)
SODIUM SERPL-SCNC: 140 MMOL/L (ref 136–145)
TOTAL IRON BINDING CAPACITY: 321 UG/DL (ref 250–450)
TRANSFERRIN SERPL-MCNC: 217 MG/DL (ref 200–375)
VIT B12 SERPL-MCNC: 328 PG/ML (ref 210–950)
WBC # BLD AUTO: 8.74 K/UL (ref 3.9–12.7)

## 2019-08-02 PROCEDURE — 82728 ASSAY OF FERRITIN: CPT

## 2019-08-02 PROCEDURE — 96376 TX/PRO/DX INJ SAME DRUG ADON: CPT

## 2019-08-02 PROCEDURE — 84145 PROCALCITONIN (PCT): CPT

## 2019-08-02 PROCEDURE — 25000003 PHARM REV CODE 250: Performed by: STUDENT IN AN ORGANIZED HEALTH CARE EDUCATION/TRAINING PROGRAM

## 2019-08-02 PROCEDURE — 63600175 PHARM REV CODE 636 W HCPCS: Performed by: STUDENT IN AN ORGANIZED HEALTH CARE EDUCATION/TRAINING PROGRAM

## 2019-08-02 PROCEDURE — 80053 COMPREHEN METABOLIC PANEL: CPT

## 2019-08-02 PROCEDURE — 83735 ASSAY OF MAGNESIUM: CPT

## 2019-08-02 PROCEDURE — 85025 COMPLETE CBC W/AUTO DIFF WBC: CPT

## 2019-08-02 PROCEDURE — 82607 VITAMIN B-12: CPT

## 2019-08-02 PROCEDURE — 94761 N-INVAS EAR/PLS OXIMETRY MLT: CPT

## 2019-08-02 PROCEDURE — 82746 ASSAY OF FOLIC ACID SERUM: CPT

## 2019-08-02 PROCEDURE — 83540 ASSAY OF IRON: CPT

## 2019-08-02 PROCEDURE — G0378 HOSPITAL OBSERVATION PER HR: HCPCS

## 2019-08-02 RX ORDER — ONDANSETRON 4 MG/1
8 TABLET, FILM COATED ORAL
Qty: 12 TABLET | Refills: 0 | Status: SHIPPED | OUTPATIENT
Start: 2019-08-02 | End: 2019-08-02 | Stop reason: SDUPTHER

## 2019-08-02 RX ORDER — PROMETHAZINE HYDROCHLORIDE 12.5 MG/1
12.5 TABLET ORAL EVERY 6 HOURS PRN
Status: DISCONTINUED | OUTPATIENT
Start: 2019-08-02 | End: 2019-08-02 | Stop reason: HOSPADM

## 2019-08-02 RX ORDER — PROMETHAZINE HYDROCHLORIDE 12.5 MG/1
12.5 TABLET ORAL ONCE
Status: COMPLETED | OUTPATIENT
Start: 2019-08-02 | End: 2019-08-02

## 2019-08-02 RX ORDER — DIPHENOXYLATE HYDROCHLORIDE AND ATROPINE SULFATE 2.5; .025 MG/1; MG/1
1 TABLET ORAL 2 TIMES DAILY
Qty: 10 TABLET | Refills: 0 | Status: SHIPPED | OUTPATIENT
Start: 2019-08-02 | End: 2019-08-02 | Stop reason: HOSPADM

## 2019-08-02 RX ORDER — ONDANSETRON 4 MG/1
8 TABLET, FILM COATED ORAL
Qty: 16 TABLET | Refills: 0 | Status: SHIPPED | OUTPATIENT
Start: 2019-08-02 | End: 2019-08-05

## 2019-08-02 RX ORDER — DIPHENOXYLATE HYDROCHLORIDE AND ATROPINE SULFATE 2.5; .025 MG/1; MG/1
1 TABLET ORAL 2 TIMES DAILY
Qty: 10 TABLET | Refills: 0 | Status: SHIPPED | OUTPATIENT
Start: 2019-08-02 | End: 2019-08-02 | Stop reason: SDUPTHER

## 2019-08-02 RX ORDER — LOPERAMIDE HYDROCHLORIDE 2 MG/1
2 CAPSULE ORAL 4 TIMES DAILY PRN
Qty: 12 CAPSULE | Refills: 0 | Status: SHIPPED | OUTPATIENT
Start: 2019-08-02

## 2019-08-02 RX ADMIN — PROMETHAZINE HYDROCHLORIDE 12.5 MG: 12.5 TABLET ORAL at 05:08

## 2019-08-02 RX ADMIN — ONDANSETRON 8 MG: 2 INJECTION INTRAMUSCULAR; INTRAVENOUS at 11:08

## 2019-08-02 RX ADMIN — ONDANSETRON 8 MG: 2 INJECTION INTRAMUSCULAR; INTRAVENOUS at 07:08

## 2019-08-02 NOTE — PLAN OF CARE
Discharge order noted, NO HH or DME noted. Pt OK with follow up appt with [facility] leonela [facility]. [place] placed info for [facility] primary care clinic on AVS if pt want to change appt.. Pt can be discharged from CM standpoint.    Discharge rounds on patient. Discussed followup appointments, blue discharge folder, discharge nurse will go over home medications and reasons for medications and final discharge instructions. All patient/caregiver questions answered. Patient verbalized understanding.      Future Appointments   Date Time Provider Department Center   8/14/2019  2:20 PM Latoya Winchester MD [facility]          08/02/19 1641   Final Note   Assessment Type Discharge Planning Assessment   Anticipated Discharge Disposition Home   What phone number can be called within the next 1-3 days to see how you are doing after discharge? [phone]   Hospital Follow Up  Appt(s) scheduled? Yes   Discharge plans and expectations educations in teach back method with documentation complete? Yes   Right Care Referral Info   Post Acute Recommendation No Care   Tamica Rojas RN-BC  Transitional Navigator  [phone]

## 2019-08-02 NOTE — DISCHARGE SUMMARY
"[facility] Hospital Medicine Discharge Summary    Primary Team: [facility] Hospitalist Team A  Attending Physician: Audrey  Resident: Arley  Intern: Albina    Date of Admit: 7/31/2019  Date of Discharge: 8/2/2019    Discharge to: Home  Condition: stable    Discharge Diagnoses     Patient Active Problem List   Diagnosis    Viral gastroenteritis    Migraine    Seizure disorder    Hypokalemia    Dehydration       Consultants and Procedures     Consultants:  none    Procedures:   none    Imaging:  Chest x ray:  No acute cardiopulmonary abnormalities    CT abdomen:  Normal appendix.  No urolithiasis or hydronephrosis.  No bowel dilation or inflammatory process.      Brief History of Present Illness      Flo Wayne is a 24 y.o. male with a history of seizure disorder and migraines who presented to [facility] on 7/31/2019 with a primary complaint of abdominal pain x2 days.     Patient states that he first started feeling abnormal on Sunday 7/28 when he began to have a sore throat. The next day, the throat pain became much worse, stating it was difficult to swallow food and water, and he started having additionally "cold flashes," nausea, vomiting, headache, watery diarrhea and stabbing, intermittent, LLQ abdominal pain. When asked to quantify how many episodes of vomiting and diarrhea, he states it was too many to count. Denied noticing any blood in either vomitus or diarrhea. Patient continued to have constant episodes of vomiting and diarrhea making it unable to keep down fluids. Went to the ED on Monday where he was diagnosed with viral gastroenteritis, given fluids, and told to return if symptoms did not improve. Tuesday the symptoms continued to deteriorate, and patient presented to the ED again today 7/31.     States that this has never happened to him before, stating that he has had stomach problems in the past with diarrhea but never with this acuity or severity. Reports he had multiple colonoscopies as a " "child for GI issues that only showed a few polyps.  Denies any recent intake of raw shellfish, recent travel history, or drinking from freshwater streams/lakes.     Patient is new to our system having moved from [place] in February of this year.    Patient was admitted and had an extensive infectious work up which was all negative including c diff. He was treated with supportive care (zofran and fluids) and clinically improved, eventually able to tolerate PO intake.    Hospital Course By Problem with Pertinent Findings   Viral vs Bacterial Gastroenteritis  - sent home with 2 days of schedule zofran and loperamide for persistent nausea and vomiting  - able to tolerate regular diet     Hypokalemia  - resolved with replacement     Dehydration  - 2/2 vomiting and diarrhea  - resolved with improved PO intake     Anemia  - Hg 14 at discharge  - transient anemia likely dilutional     Migraine  - not currently experiencing symptoms  - continued home PRN sumatriptan on discharge     Seizure Disorder  - hasn't had a seizure in "years"  - prescribed lamictal but hasn't taken it in months, did not represcribe on discharge    Discharge Medications      Flo Wayne   Home Medication Instructions MICHEAL:[id number]    Printed on:08/02/19 1611   Medication Information                      loperamide (IMODIUM) 2 mg capsule  Take 1 capsule (2 mg total) by mouth 4 (four) times daily as needed (diarrhea).             ondansetron (ZOFRAN) 4 MG tablet  Take 2 tablets (8 mg total) by mouth 3 (three) times daily with meals. for 3 days             SUMATRIPTAN SUCCINATE ORAL  Take by mouth as needed.                 Discharge Information:   Diet:  Regular    Physical Activity:  As tolerated             Instructions:  1. Take all medications as prescribed  2. Keep all follow-up appointments  3. Return to the hospital or call your primary care physicians if any worsening symptoms such as fever, chest pain, shortness of breath, return of " symptoms, or any other concerns.    Follow-Up Appointments:  [facility] Internal Medicine 1-2 weeks    Sabina Montemayor MD  [facility] Internal Medicine, -II

## 2019-08-02 NOTE — PLAN OF CARE
Pt AAOx4, pt independent with ADLs, pt lives alone. TN to establish care with PCP at [facility] for pt.    Discharge brochure and blue discharge folder given to pt. TN updated contact information on whiteboard.       08/01/19 4108   Discharge Assessment   Assessment Type Discharge Planning Assessment   Confirmed/corrected address and phone number on facesheet? Yes   Assessment information obtained from? Patient   Communicated expected length of stay with patient/caregiver yes   Prior to hospitilization cognitive status: Alert/Oriented   Prior to hospitalization functional status: Independent   Current cognitive status: Alert/Oriented   Current Functional Status: Independent   Lives With alone   Able to Return to Prior Arrangements yes   Is patient able to care for self after discharge? Yes   Who are your caregiver(s) and their phone number(s)? POTTER,BROOK Mother   [phone]    Patient's perception of discharge disposition home or selfcare   Readmission Within the Last 30 Days no previous admission in last 30 days   Patient currently being followed by outpatient case management? No   Patient currently receives any other outside agency services? No   Equipment Currently Used at Home none   Do you have any problems affording any of your prescribed medications? No   Is the patient taking medications as prescribed? yes   Does the patient have transportation home? Yes   Transportation Anticipated family or friend will provide   Does the patient receive services at the [facility]? No   Discharge Plan A Home;Home with family   Discharge Plan B Home;Home with family   DME Needed Upon Discharge  none   Patient/Family in Agreement with Plan yes   Tamica Rojas RN-BC  Transitional Navigator  [phone]

## 2019-08-02 NOTE — PLAN OF CARE
Problem: Adult Inpatient Plan of Care  Goal: Plan of Care Review  Outcome: Ongoing (interventions implemented as appropriate)  Patient is resting and AAOx4. Medications given per orders in MAR. PRN pain medication was obtained for headache. Urinal provided and results came back negative for C.Diff. VSS. Patient had bowel movements during shift, documentation can be found in Flowsheets. Patient receives scheduled Zofran for nausea. Friends came visit during shift and still a 1+ hours during shift. Safety maintained, remained free from falls. Instructed patient to all about concerns, needs, or assistance. Will continue to monitor.

## 2019-08-02 NOTE — PROGRESS NOTES
[facility] Medicine Progress Note    Primary Team: \[facility] [place] [facility]\"" Hospitalist Team A  Attending Physician: Audrey  Resident: Arley  Intern: Albina    Subjective:      Mr. Wayne still reports feeling nauseous and having abdominal pain this morning. He was also still having watery diarrhea and subjective fevers at night. He is tolerating clear liquids and willing to try solids.     Objective:     Last 24 Hour Vital Signs:  BP  Min: 120/73  Max: 134/80  Temp  Av.5 °F (37.5 °C)  Min: 99.1 °F (37.3 °C)  Max: 100 °F (37.8 °C)  Pulse  Av.5  Min: 77  Max: 115  Resp  Av  Min: 18  Max: 22  SpO2  Av.8 %  Min: 96 %  Max: 98 %  Weight  Av.5 kg (195 lb 1.7 oz)  Min: 88.5 kg (195 lb 1.7 oz)  Max: 88.5 kg (195 lb 1.7 oz)  I/O last 3 completed shifts:  In: 2206.7 [P.O.:1485; I.V.:721.7]  Out: 1400 [Urine:1400]    Physical Examination:  Gen: AAOx3, appears diaphoretic and in mild distress  HEENT: head normocephalic, atraumatic  Cardiac: regular rate and rhythm; no murmurs, rubs, or gallops  Resp: clear to auscultation bilaterally, normal work of breathing  GI: abdomen diffusely tender to palpation, most notably in the lower quadrants; soft, non-distended; normoactive bowel sounds  Extrem: no clubbing or swelling noted  Skin: no rashes or bruises noted  Neuro: AAOx3, moving all extremities without difficulty       Laboratory:  Laboratory Data Reviewed: yes  Pertinent Findings:  CMP  Sodium   Date Value Ref Range Status   2019 140 136 - 145 mmol/L Final     Potassium   Date Value Ref Range Status   2019 3.7 3.5 - 5.1 mmol/L Final     Chloride   Date Value Ref Range Status   2019 102 95 - 110 mmol/L Final     CO2   Date Value Ref Range Status   2019 25 23 - 29 mmol/L Final     Glucose   Date Value Ref Range Status   2019 90 70 - 110 mg/dL Final     BUN, Bld   Date Value Ref Range Status   2019 6 6 - 20 mg/dL Final     Creatinine   Date Value Ref Range Status   2019 1.0 0.5 - 1.4  mg/dL Final     Calcium   Date Value Ref Range Status   08/02/2019 9.2 8.7 - 10.5 mg/dL Final     Total Protein   Date Value Ref Range Status   08/02/2019 6.9 6.0 - 8.4 g/dL Final     Albumin   Date Value Ref Range Status   08/02/2019 3.3 (L) 3.5 - 5.2 g/dL Final     Total Bilirubin   Date Value Ref Range Status   08/02/2019 0.5 0.1 - 1.0 mg/dL Final     Comment:     For infants and newborns, interpretation of results should be based  on gestational age, weight and in agreement with clinical  observations.  Premature Infant recommended reference ranges:  Up to 24 hours.............<8.0 mg/dL  Up to 48 hours............<12.0 mg/dL  3-5 days..................<15.0 mg/dL  6-29 days.................<15.0 mg/dL       Alkaline Phosphatase   Date Value Ref Range Status   08/02/2019 92 55 - 135 U/L Final     AST   Date Value Ref Range Status   08/02/2019 17 10 - 40 U/L Final     ALT   Date Value Ref Range Status   08/02/2019 29 10 - 44 U/L Final     Anion Gap   Date Value Ref Range Status   08/02/2019 13 8 - 16 mmol/L Final     eGFR if    Date Value Ref Range Status   08/02/2019 >60 >60 mL/min/1.73 m^2 Final     eGFR if non    Date Value Ref Range Status   08/02/2019 >60 >60 mL/min/1.73 m^2 Final     Comment:     Calculation used to obtain the estimated glomerular filtration  rate (eGFR) is the CKD-EPI equation.        Lab Results   Component Value Date    WBC 8.74 08/02/2019    HGB 14.0 08/02/2019    HCT 43.3 08/02/2019    MCV 85 08/02/2019     08/02/2019     Procal 0.27      Microbiology Data Reviewed: yes  Pertinent Findings:  C diff negative  Blood cultures NGTD  Strep culture NG  E coli 0157 antigen negative  Stool culture pending  Flu A and B negative  HIV negative  Heterophile antibody negative  Stool ova and parasites negative    Other Results:  EKG (my interpretation): none    Radiology Data Reviewed: yes  Pertinent Findings:  none    Current Medications:     Infusions:  "none       Scheduled: mag sulfate 2g once, ondansetron IV 8 mg TID, potassium chloride tab 40 mg   ondansetron  8 mg Intravenous TID WM        PRN:  Dextrose 10% Bolus, Dextrose 10% Bolus, dicyclomine, glucagon (human recombinant), glucose, glucose, promethazine, sodium chloride 0.9%, tramadol-acetaminophen 37.5-325 mg    Antibiotics and Day Number of Therapy:  none    Lines and Day Number of Therapy:  PIV    Assessment:     Flo Wayne is a 24 y.o.male with  Patient Active Problem List    Diagnosis Date Noted    Dehydration     Viral gastroenteritis 07/31/2019    Migraine 07/31/2019    Seizure disorder 07/31/2019    Hypokalemia 07/31/2019        Plan:     Viral vs Bacterial Gastroenteritis  - on admission febrile 102.9, , RR 25, WBC 13.35  - this morning 99.1, , RR 18, WBC 7.48  - URI symptoms preceding onset of nausea and diarrhea  - CT negative for inflammatory process  - infectious work up negative thus far, c diff negative  - Procal 0.27  - supportive care, likely viral, scheduled zofran 8mg TID and dicyclomine and ultracet PRN  - advance diet as tolerated    Hypokalemia  - 3.4 on admission and 3.7 today  - likely 2/2 vomiting and diarrhea  - Magnesium 2.1  - will continue to replace as needed    Dehydration  - 2/2 vomiting and diarrhea  - now tolerating clear liquids    Anemia  - Hg 15.6 on admission, 12.2 yesterday  - Hg 14.0 today  - likely dilutional  - Iron studies, B12, folate in process    Migraine  - not currently experiencing symptoms    Seizure Disorder  - hasn't had a seizure in "years"  - prescribed lamictal but hasn't taken it in months    Healthcare maintenance  -UTD on Tdap, Flu  -Needs PCP    Nutrition: Adult  PPx: SCD  Code status: Full    Dispo: likely dc today as patient is tolerating PO intake with PCP follow up in 1-2 days    Sabina Montemayor MD  [facility] HO-I    [facility] Medicine Hospitalist Pager numbers:   [facility] Hospitalist Medicine Team A " (Audrey/Mari): [phone]  [facility] Hospitalist Medicine Team B (Scotty/Erasmo):  [phone]

## 2019-08-02 NOTE — NURSING
Was called into room by patient because he stated that he has pulled his IV out while sleeping. Patient stated that he was very nauseous and wanted to know if he could receive something. TEAM was called and notified about situation. Waiting for orders to be put in.

## 2019-08-02 NOTE — MEDICAL/APP STUDENT
[facility] L4 Medical Student Internal Medicine Progress Note    Subjective:      Flo Wayne is a 24 y.o. male who is being followed by the [facility] IM service at [facility] for likely viral gastroenteritis with fever, sore throat, nausea, vomiting, diarrhea, and diffuse abdominal pain.    Nausea, vomiting, diarrhea well controlled yesterday on current regimen. Some refractory nausea this morning, was given phenergan which was helpful. Patient states overall he is significantly improved, taking CLD well and keeping it down.     Objective:   Last 24 Hour Vital Signs:  BP  Min: 120/72  Max: 134/80  Temp  Av.8 °F (37.7 °C)  Min: 99.1 °F (37.3 °C)  Max: 101.3 °F (38.5 °C)  Pulse  Av.4  Min: 77  Max: 115  Resp  Av.6  Min: 18  Max: 22  SpO2  Av.8 %  Min: 96 %  Max: 98 %  Weight  Av.5 kg (195 lb 1.7 oz)  Min: 88.5 kg (195 lb 1.7 oz)  Max: 88.5 kg (195 lb 1.7 oz)  I/O last 3 completed shifts:  In: .7 [P.O.:600; I.V.:721.7; IV Piggyback:700]  Out: 1000 [Urine:1000]    Physical Examination:  General:          Alert and awake, sitting in the bed with vomit bag  Head:               Normocephalic and atraumatic  Neck:               Submandibular lymphadenopathy present bilaterally  Eyes:               PERRL; EOMi with anicteric sclera and clear conjunctivae  Mouth:             Oropharynx clear and without exudate, some pharyngeal erythema appreciated; moist mucous membranes  Cardio:             Regular rate and rhythm with normal S1 and S2; no murmurs or rubs  Resp:               CTAB and unlabored; no wheezes, crackles or rhonchi  Abdom:            Soft, normoactive bowel sounds, nontender to palpation this morning. Liver span 3cm below costal margin  Extrem:            WWP with no clubbing, cyanosis or edema  Skin:                No rashes, lesions, or color changes, cap refill <2 secs  Pulses:            2+ and symmetric distally  Neuro:             AAOx3; cooperative and pleasant with no  focal deficits    Laboratory:  Laboratory Data Reviewed: yes  Pertinent Findings:  Recent Labs   Lab 07/31/19  1421 08/01/19  0748 08/02/19  0555   WBC 13.35* 7.48 8.74   HGB 15.6 12.2* 14.0   HCT 47.2 37.6* 43.3    208 256    137 140   K 3.4* 3.4* 3.7    106 102   CREATININE 1.3 0.9 1.0   BUN 9 6 6   CO2 25 22* 25   ALT 30 26 29   AST 18 24 17       Microbiology Data Reviewed: yes  Pertinent Findings:  Microbiology Results (last 7 days)     Procedure Component Value Units Date/Time    Clostridium difficile EIA [[id number]] Collected:  08/01/19 1842    Order Status:  Completed Specimen:  Stool Updated:  08/01/19 2054     C. diff Antigen Negative     C difficile Toxins A+B, EIA Negative     Comment: Testing not recommended for children <24 months old.       Blood culture x two cultures. Draw prior to antibiotics. [[id number]] Collected:  07/31/19 1420    Order Status:  Completed Specimen:  Blood from Peripheral, Antecubital, Left Updated:  08/01/19 1812     Blood Culture, Routine No Growth to date      No Growth to date    Narrative:       Aerobic and anaerobic  right hand    Blood culture x two cultures. Draw prior to antibiotics. [[id number]] Collected:  07/31/19 1410    Order Status:  Completed Specimen:  Blood from Peripheral, Antecubital, Right Updated:  08/01/19 1812     Blood Culture, Routine No Growth to date      No Growth to date    Narrative:       Aerobic and anaerobic  right AC    Stool culture **CANNOT BE ORDERED STAT** [[id number]] Collected:  07/31/19 1805    Order Status:  Sent Specimen:  Stool Updated:  07/31/19 2208    E. coli 0157 antigen [[id number]] Collected:  07/31/19 1805    Order Status:  No result Specimen:  Stool Updated:  07/31/19 2208    Strep A culture, throat [[id number]] Collected:  07/31/19 1452    Order Status:  No result Specimen:  Throat Updated:  07/31/19 1921    Influenza A & B by Molecular [[id number]] Collected:  07/31/19 1525    Order Status:  Completed Specimen:   Nasopharyngeal Swab Updated:  07/31/19 1613     Influenza A, Molecular Negative     Influenza B, Molecular Negative     Flu A & B Source Nasal swab    Rapid strep screen [[id number]] Collected:  07/31/19 1452    Order Status:  Completed Specimen:  Throat Updated:  07/31/19 1506     Rapid Strep A Screen Negative     Comment: See Micro for reflexed Strep culture.             Other Results:  Radiology Data Reviewed: yes  Pertinent Findings:  CXR and CT abdomen negative for acute cardiopulmonary abnormalities, appendicitis, urolithiasis or hydronephrosis, or bowel dilation or inflammatory process    Current Medications:     Infusions:       Scheduled:   ondansetron  8 mg Intravenous TID WM        PRN:  Dextrose 10% Bolus, Dextrose 10% Bolus, dicyclomine, glucagon (human recombinant), glucose, glucose, sodium chloride 0.9%, tramadol-acetaminophen 37.5-325 mg    Antibiotics and Day Number of Therapy:  None    Lines and Day Number of Therapy:  PIV x1    Assessment:     Flo Wayne is a 24 y.o.male with no pertinent past medical history who presents with 4 day history of nausea, vomiting, abdominal pain and watery diarrhea. Reasonably certain at this point that this is a viral process. Will continue to treat supportively and work on dehydration and controlling nausea and fever.       Patient Active Problem List    Diagnosis Date Noted    Dehydration     Viral gastroenteritis 07/31/2019    Migraine 07/31/2019    Seizure disorder 07/31/2019    Hypokalemia 07/31/2019        Plan:     Viral Gastroenteritis  -Patient febrile in ED to 102.9, tachycardic to 144, tachypnic to 25  -URI symptoms earlier this week before onset of abd pain, nausea and diarrhea  -Denies blood in diarrhea  -Hepatomegaly on exam  -CXR and abd CT scan negative for any acute process  -Heterophile Ab screen negative  -Lactate 1.6, trended to 0.5  -HIV negative  -Procalcitonin pending  -BCx, stool cx, stool ova and parasites pending  -E Coli 0157 antigen  "pending  -Stool WBC + many neutrophils  -WBC in ED 13.35-->leukocytosis resolved 8/1  -Afebrile since yesterday morning 101.3, in last 24 hours intermittently tachycardic to 115 with respiratory rate 22, good blood pressures  -Given phenergan last night 2/2 refractory nausea  -Clinically improved this morning with less abdominal tenderness, better controlled diarrhea and nausea.  -At this point etiology likely viral, will treat supportively with ibuprofen, zofran, tramadol-acetaminophen     Mild Hypokalemia  -K 3.4 on admission, likely 2/2 vomiting and diarrhea  -40 units of K for potassium repletion night of 7/31  -8/1 morning continues to be 3.4, likely due to GI losses, 80 addl units given, K this morning 3.7  -Given Mg night of 8/1, Mg level this morning 2.1     Dehydration 2/2 vomiting and diarrhea  -Patient states has not been able to drink water since Sunday  -Received NS bolus in ED consistent with sepsis protocol  -Admission Cr 1.3 with BUN 9--> <20:1 BUN/Cr ratio possible indicator of glomerular damage.  -Admission UA 2+ protein, 2+ ketones  -FeNa 8/1 1.9%  -Cr this morning 1.0  -Blood pressures stable at this time  -Fluid replete at this time, d/c'd fluids last night. Will consider putting back on if PO intake continues to be poor     Migraines  -No acute exacerbation  -PRN sumatriptan     Seizure disorder  -Well controlled, has not had a seizure in "years"  -Takes Lamictal for ppx, but has not taken it for six months since he moved to [place]     F: None  E: Wnl  N: CLD     Dispo: pending adequate PO intake and normalization of electrolytes     Juan Cevallos  [facility] Internal Medicine L4 Medical Student  [employer] IM Service Team A    [facility] Medicine Hospitalist Pager numbers:   [facility] Hospitalist Medicine Team A (Audrey/Mari): [phone]  [facility] Hospitalist Medicine Team B (Scotty/Erasmo):  [phone]    "

## 2019-08-02 NOTE — DISCHARGE INSTRUCTIONS
Gastroenteritis, Viral (Adult) (English) View Edit Remove  Dehydration (Adult) (English) View Edit Remove  Loperamide tablets or capsules (English) View Edit Remove  Ondansetron oral solution (English) View Edit Remove  Diet, [place] (Adult) (English) View Edit Remove  Diet: Soft, Discharge Instructions (English) View Edit Remove

## 2019-08-02 NOTE — PROGRESS NOTES
Pt being discharged per  order. IV and telemetry box removed. Prescriptions delivered by bedside pharmacy. Pt given discharge paperwork to be reviewed with VN. Pt had no questions or concerns at this time. Will continue to monitor.

## 2019-08-02 NOTE — PLAN OF CARE
AVS and educational attachments shared with patient via [facility]. Discussed thoroughly. Patient verbalized clear understanding using teach back method. Notified bedside nurse of completion of education. At present no distress noted. Patient to be discharged via w/c with escort service and family with all of patient's belonings. Will cont to be available to patient and family and intervene prn.

## 2019-08-03 LAB — BACTERIA THROAT CULT: NORMAL

## 2019-08-05 LAB
BACTERIA BLD CULT: NORMAL
BACTERIA BLD CULT: NORMAL
BACTERIA STL CULT: NORMAL

## 2019-08-14 PROBLEM — E87.6 HYPOKALEMIA: Status: RESOLVED | Noted: 2019-07-31 | Resolved: 2019-08-14

## 2019-08-14 PROBLEM — A08.4 VIRAL GASTROENTERITIS: Status: RESOLVED | Noted: 2019-07-31 | Resolved: 2019-08-14

## 2019-10-29 ENCOUNTER — HOSPITAL ENCOUNTER (EMERGENCY)
Facility: HOSPITAL | Age: 24
Discharge: HOME | End: 2019-10-29
Attending: EMERGENCY MEDICINE
Payer: COMMERCIAL

## 2019-10-29 VITALS
OXYGEN SATURATION: 100 % | BODY MASS INDEX: 28.88 KG/M2 | SYSTOLIC BLOOD PRESSURE: 114 MMHG | TEMPERATURE: 98 F | RESPIRATION RATE: 25 BRPM | DIASTOLIC BLOOD PRESSURE: 72 MMHG | WEIGHT: 195 LBS | HEART RATE: 81 BPM | HEIGHT: 69 IN

## 2019-10-29 DIAGNOSIS — R11.2 NON-INTRACTABLE VOMITING WITH NAUSEA, UNSPECIFIED VOMITING TYPE: ICD-10-CM

## 2019-10-29 DIAGNOSIS — R53.83 FATIGUE: ICD-10-CM

## 2019-10-29 DIAGNOSIS — R53.83 FATIGUE, UNSPECIFIED TYPE: Primary | ICD-10-CM

## 2019-10-29 LAB
ALBUMIN SERPL BCP-MCNC: 3.8 G/DL (ref 3.5–5.2)
ALP SERPL-CCNC: 65 U/L (ref 55–135)
ALT SERPL W/O P-5'-P-CCNC: 24 U/L (ref 10–44)
AMPHET+METHAMPHET UR QL: NEGATIVE
ANION GAP SERPL CALC-SCNC: 10 MMOL/L (ref 8–16)
AST SERPL-CCNC: 13 U/L (ref 10–40)
BARBITURATES UR QL SCN>200 NG/ML: NEGATIVE
BASOPHILS # BLD AUTO: 0.03 K/UL (ref 0–0.2)
BASOPHILS NFR BLD: 0.4 % (ref 0–1.9)
BENZODIAZ UR QL SCN>200 NG/ML: NEGATIVE
BILIRUB SERPL-MCNC: 0.9 MG/DL (ref 0.1–1)
BILIRUB UR QL STRIP: NEGATIVE
BUN SERPL-MCNC: 14 MG/DL (ref 6–20)
BZE UR QL SCN: NEGATIVE
CALCIUM SERPL-MCNC: 9 MG/DL (ref 8.7–10.5)
CANNABINOIDS UR QL SCN: NEGATIVE
CHLORIDE SERPL-SCNC: 107 MMOL/L (ref 95–110)
CLARITY UR: CLEAR
CO2 SERPL-SCNC: 26 MMOL/L (ref 23–29)
COLOR UR: YELLOW
CREAT SERPL-MCNC: 1.1 MG/DL (ref 0.5–1.4)
CREAT UR-MCNC: 123.5 MG/DL (ref 23–375)
DIFFERENTIAL METHOD: ABNORMAL
EOSINOPHIL # BLD AUTO: 0.2 K/UL (ref 0–0.5)
EOSINOPHIL NFR BLD: 2.5 % (ref 0–8)
ERYTHROCYTE [DISTWIDTH] IN BLOOD BY AUTOMATED COUNT: 13.4 % (ref 11.5–14.5)
EST. GFR  (AFRICAN AMERICAN): >60 ML/MIN/1.73 M^2
EST. GFR  (NON AFRICAN AMERICAN): >60 ML/MIN/1.73 M^2
ETHANOL SERPL-MCNC: <10 MG/DL
GLUCOSE SERPL-MCNC: 91 MG/DL (ref 70–110)
GLUCOSE UR QL STRIP: NEGATIVE
HCT VFR BLD AUTO: 43.1 % (ref 40–54)
HGB BLD-MCNC: 14 G/DL (ref 14–18)
HGB UR QL STRIP: NEGATIVE
KETONES UR QL STRIP: NEGATIVE
LEUKOCYTE ESTERASE UR QL STRIP: NEGATIVE
LYMPHOCYTES # BLD AUTO: 1.2 K/UL (ref 1–4.8)
LYMPHOCYTES NFR BLD: 17.4 % (ref 18–48)
MCH RBC QN AUTO: 27.7 PG (ref 27–31)
MCHC RBC AUTO-ENTMCNC: 32.5 G/DL (ref 32–36)
MCV RBC AUTO: 85 FL (ref 82–98)
METHADONE UR QL SCN>300 NG/ML: NEGATIVE
MONOCYTES # BLD AUTO: 0.7 K/UL (ref 0.3–1)
MONOCYTES NFR BLD: 10.9 % (ref 4–15)
NEUTROPHILS # BLD AUTO: 4.6 K/UL (ref 1.8–7.7)
NEUTROPHILS NFR BLD: 68.8 % (ref 38–73)
NITRITE UR QL STRIP: NEGATIVE
OPIATES UR QL SCN: NEGATIVE
PCP UR QL SCN>25 NG/ML: NEGATIVE
PH UR STRIP: 7 [PH] (ref 5–8)
PLATELET # BLD AUTO: 282 K/UL (ref 150–350)
PMV BLD AUTO: 9.5 FL (ref 9.2–12.9)
POTASSIUM SERPL-SCNC: 3.7 MMOL/L (ref 3.5–5.1)
PROT SERPL-MCNC: 6.7 G/DL (ref 6–8.4)
PROT UR QL STRIP: NEGATIVE
RBC # BLD AUTO: 5.06 M/UL (ref 4.6–6.2)
SODIUM SERPL-SCNC: 143 MMOL/L (ref 136–145)
SP GR UR STRIP: 1.01 (ref 1–1.03)
TOXICOLOGY INFORMATION: NORMAL
URN SPEC COLLECT METH UR: NORMAL
UROBILINOGEN UR STRIP-ACNC: NEGATIVE EU/DL
WBC # BLD AUTO: 6.67 K/UL (ref 3.9–12.7)

## 2019-10-29 PROCEDURE — 99284 EMERGENCY DEPT VISIT MOD MDM: CPT | Mod: 25

## 2019-10-29 PROCEDURE — 25000003 PHARM REV CODE 250: Performed by: NURSE PRACTITIONER

## 2019-10-29 PROCEDURE — 85025 COMPLETE CBC W/AUTO DIFF WBC: CPT

## 2019-10-29 PROCEDURE — 80175 DRUG SCREEN QUAN LAMOTRIGINE: CPT

## 2019-10-29 PROCEDURE — 96360 HYDRATION IV INFUSION INIT: CPT

## 2019-10-29 PROCEDURE — 63600175 PHARM REV CODE 636 W HCPCS: Performed by: NURSE PRACTITIONER

## 2019-10-29 PROCEDURE — 80320 DRUG SCREEN QUANTALCOHOLS: CPT

## 2019-10-29 PROCEDURE — 81003 URINALYSIS AUTO W/O SCOPE: CPT | Mod: 59

## 2019-10-29 PROCEDURE — 80053 COMPREHEN METABOLIC PANEL: CPT

## 2019-10-29 PROCEDURE — 93005 ELECTROCARDIOGRAM TRACING: CPT

## 2019-10-29 PROCEDURE — 80307 DRUG TEST PRSMV CHEM ANLYZR: CPT

## 2019-10-29 RX ORDER — LAMOTRIGINE 25 MG/1
25 TABLET ORAL 2 TIMES DAILY
Qty: 30 TABLET | Refills: 0 | Status: SHIPPED | OUTPATIENT
Start: 2019-10-29

## 2019-10-29 RX ORDER — LAMOTRIGINE 25 MG/1
25 TABLET ORAL DAILY
Status: DISCONTINUED | OUTPATIENT
Start: 2019-10-29 | End: 2019-10-29 | Stop reason: HOSPADM

## 2019-10-29 RX ORDER — ONDANSETRON 4 MG/1
4 TABLET, ORALLY DISINTEGRATING ORAL EVERY 8 HOURS PRN
Qty: 10 TABLET | Refills: 0 | OUTPATIENT
Start: 2019-10-29 | End: 2021-07-19

## 2019-10-29 RX ORDER — LAMOTRIGINE 200 MG/1
800 TABLET ORAL 2 TIMES DAILY
COMMUNITY
End: 2019-10-29 | Stop reason: SDUPTHER

## 2019-10-29 RX ORDER — ONDANSETRON 4 MG/1
4 TABLET, ORALLY DISINTEGRATING ORAL
Status: COMPLETED | OUTPATIENT
Start: 2019-10-29 | End: 2019-10-29

## 2019-10-29 RX ORDER — LAMOTRIGINE 100 MG/1
200 TABLET ORAL DAILY
Status: DISCONTINUED | OUTPATIENT
Start: 2019-10-29 | End: 2019-10-29

## 2019-10-29 RX ADMIN — LAMOTRIGINE 25 MG: 25 TABLET ORAL at 03:10

## 2019-10-29 RX ADMIN — ONDANSETRON 4 MG: 4 TABLET, ORALLY DISINTEGRATING ORAL at 02:10

## 2019-10-29 RX ADMIN — SODIUM CHLORIDE, SODIUM LACTATE, POTASSIUM CHLORIDE, AND CALCIUM CHLORIDE 1000 ML: .6; .31; .03; .02 INJECTION, SOLUTION INTRAVENOUS at 02:10
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
## 2019-10-29 NOTE — DISCHARGE INSTRUCTIONS
Increase your fluid intake.  Return to the ED if your condition changes, progresses, or if you have any concerns.

## 2019-10-29 NOTE — ED NOTES
Patient stated that he still feels dizzy. Pt urinated, specimen collected. IVF completed and disconnected

## 2019-11-01 LAB — LAMOTRIGINE SERPL-MCNC: 8.8 UG/ML (ref 2–15)

## 2019-11-30 ENCOUNTER — HOSPITAL ENCOUNTER (EMERGENCY)
Facility: HOSPITAL | Age: 24
Discharge: HOME | End: 2019-12-01
Attending: EMERGENCY MEDICINE
Payer: COMMERCIAL

## 2019-11-30 DIAGNOSIS — R11.2 NAUSEA VOMITING AND DIARRHEA: Primary | ICD-10-CM

## 2019-11-30 DIAGNOSIS — R19.7 NAUSEA VOMITING AND DIARRHEA: Primary | ICD-10-CM

## 2019-11-30 DIAGNOSIS — K64.9 HEMORRHOIDS, UNSPECIFIED HEMORRHOID TYPE: ICD-10-CM

## 2019-11-30 DIAGNOSIS — R10.9 FLANK PAIN: ICD-10-CM

## 2019-11-30 LAB
BASOPHILS # BLD AUTO: 0.02 K/UL (ref 0–0.2)
BASOPHILS NFR BLD: 0.2 % (ref 0–1.9)
DIFFERENTIAL METHOD: ABNORMAL
EOSINOPHIL # BLD AUTO: 0.2 K/UL (ref 0–0.5)
EOSINOPHIL NFR BLD: 2.4 % (ref 0–8)
ERYTHROCYTE [DISTWIDTH] IN BLOOD BY AUTOMATED COUNT: 13.5 % (ref 11.5–14.5)
HCT VFR BLD AUTO: 46.1 % (ref 40–54)
HGB BLD-MCNC: 15 G/DL (ref 14–18)
LYMPHOCYTES # BLD AUTO: 1.4 K/UL (ref 1–4.8)
LYMPHOCYTES NFR BLD: 16.1 % (ref 18–48)
MCH RBC QN AUTO: 27.9 PG (ref 27–31)
MCHC RBC AUTO-ENTMCNC: 32.5 G/DL (ref 32–36)
MCV RBC AUTO: 86 FL (ref 82–98)
MONOCYTES # BLD AUTO: 1.1 K/UL (ref 0.3–1)
MONOCYTES NFR BLD: 12.4 % (ref 4–15)
NEUTROPHILS # BLD AUTO: 6.1 K/UL (ref 1.8–7.7)
NEUTROPHILS NFR BLD: 68.9 % (ref 38–73)
PLATELET # BLD AUTO: 282 K/UL (ref 150–350)
PMV BLD AUTO: 9.2 FL (ref 9.2–12.9)
RBC # BLD AUTO: 5.38 M/UL (ref 4.6–6.2)
WBC # BLD AUTO: 8.93 K/UL (ref 3.9–12.7)

## 2019-11-30 PROCEDURE — 85025 COMPLETE CBC W/AUTO DIFF WBC: CPT

## 2019-11-30 PROCEDURE — 99284 EMERGENCY DEPT VISIT MOD MDM: CPT | Mod: 25

## 2019-11-30 PROCEDURE — 96375 TX/PRO/DX INJ NEW DRUG ADDON: CPT

## 2019-11-30 PROCEDURE — 63600175 PHARM REV CODE 636 W HCPCS: Performed by: EMERGENCY MEDICINE

## 2019-11-30 PROCEDURE — 80053 COMPREHEN METABOLIC PANEL: CPT

## 2019-11-30 PROCEDURE — 81003 URINALYSIS AUTO W/O SCOPE: CPT

## 2019-11-30 PROCEDURE — 96374 THER/PROPH/DIAG INJ IV PUSH: CPT

## 2019-11-30 PROCEDURE — 83690 ASSAY OF LIPASE: CPT

## 2019-11-30 RX ORDER — KETOROLAC TROMETHAMINE 30 MG/ML
15 INJECTION, SOLUTION INTRAMUSCULAR; INTRAVENOUS
Status: COMPLETED | OUTPATIENT
Start: 2019-11-30 | End: 2019-11-30

## 2019-11-30 RX ORDER — ONDANSETRON 2 MG/ML
4 INJECTION INTRAMUSCULAR; INTRAVENOUS
Status: COMPLETED | OUTPATIENT
Start: 2019-11-30 | End: 2019-11-30

## 2019-11-30 RX ADMIN — ONDANSETRON 4 MG: 2 INJECTION INTRAMUSCULAR; INTRAVENOUS at 11:11

## 2019-11-30 RX ADMIN — KETOROLAC TROMETHAMINE 15 MG: 30 INJECTION, SOLUTION INTRAMUSCULAR at 11:11

## 2019-12-01 VITALS
HEART RATE: 83 BPM | BODY MASS INDEX: 29.33 KG/M2 | SYSTOLIC BLOOD PRESSURE: 111 MMHG | WEIGHT: 198 LBS | RESPIRATION RATE: 18 BRPM | HEIGHT: 69 IN | OXYGEN SATURATION: 96 % | DIASTOLIC BLOOD PRESSURE: 63 MMHG | TEMPERATURE: 99 F

## 2019-12-01 LAB
ALBUMIN SERPL BCP-MCNC: 3.7 G/DL (ref 3.5–5.2)
ALP SERPL-CCNC: 80 U/L (ref 55–135)
ALT SERPL W/O P-5'-P-CCNC: 47 U/L (ref 10–44)
ANION GAP SERPL CALC-SCNC: 12 MMOL/L (ref 8–16)
AST SERPL-CCNC: 24 U/L (ref 10–40)
BILIRUB SERPL-MCNC: 0.5 MG/DL (ref 0.1–1)
BILIRUB UR QL STRIP: NEGATIVE
BUN SERPL-MCNC: 10 MG/DL (ref 6–20)
CALCIUM SERPL-MCNC: 9.1 MG/DL (ref 8.7–10.5)
CHLORIDE SERPL-SCNC: 103 MMOL/L (ref 95–110)
CLARITY UR: CLEAR
CO2 SERPL-SCNC: 26 MMOL/L (ref 23–29)
COLOR UR: YELLOW
CREAT SERPL-MCNC: 1.1 MG/DL (ref 0.5–1.4)
EST. GFR  (AFRICAN AMERICAN): >60 ML/MIN/1.73 M^2
EST. GFR  (NON AFRICAN AMERICAN): >60 ML/MIN/1.73 M^2
GLUCOSE SERPL-MCNC: 84 MG/DL (ref 70–110)
GLUCOSE UR QL STRIP: NEGATIVE
HGB UR QL STRIP: ABNORMAL
KETONES UR QL STRIP: NEGATIVE
LEUKOCYTE ESTERASE UR QL STRIP: NEGATIVE
LIPASE SERPL-CCNC: 18 U/L (ref 4–60)
NITRITE UR QL STRIP: NEGATIVE
OB PNL STL: NEGATIVE
PH UR STRIP: 6 [PH] (ref 5–8)
POTASSIUM SERPL-SCNC: 3.6 MMOL/L (ref 3.5–5.1)
PROT SERPL-MCNC: 7.4 G/DL (ref 6–8.4)
PROT UR QL STRIP: NEGATIVE
SODIUM SERPL-SCNC: 141 MMOL/L (ref 136–145)
SP GR UR STRIP: 1.02 (ref 1–1.03)
URN SPEC COLLECT METH UR: ABNORMAL
UROBILINOGEN UR STRIP-ACNC: NEGATIVE EU/DL

## 2019-12-01 PROCEDURE — 82272 OCCULT BLD FECES 1-3 TESTS: CPT

## 2019-12-01 RX ORDER — ONDANSETRON 4 MG/1
4 TABLET, FILM COATED ORAL EVERY 6 HOURS
Qty: 12 TABLET | Refills: 0 | OUTPATIENT
Start: 2019-12-01 | End: 2021-07-19

## 2019-12-01 RX ORDER — HYDROCORTISONE 25 MG/G
CREAM TOPICAL 2 TIMES DAILY
Qty: 1 TUBE | Refills: 0 | Status: SHIPPED | OUTPATIENT
Start: 2019-12-01

## 2019-12-01 RX ORDER — DICYCLOMINE HYDROCHLORIDE 20 MG/1
20 TABLET ORAL 2 TIMES DAILY
Qty: 20 TABLET | Refills: 0 | Status: SHIPPED | OUTPATIENT
Start: 2019-12-01 | End: 2019-12-31

## 2019-12-01 RX ORDER — IBUPROFEN 600 MG/1
600 TABLET ORAL EVERY 6 HOURS PRN
Qty: 20 TABLET | Refills: 0 | OUTPATIENT
Start: 2019-12-01 | End: 2020-06-28

## 2019-12-01 RX ORDER — PROMETHAZINE HYDROCHLORIDE 25 MG/1
25 TABLET ORAL EVERY 6 HOURS PRN
Qty: 15 TABLET | Refills: 0 | OUTPATIENT
Start: 2019-12-01 | End: 2021-07-19

## 2019-12-01 NOTE — ED NOTES
"Pt c/o left flank pain that radiates to right flank and left lower abdominal quadrant x 2 days. Pt reports fever but has not taken his temperature. States he woke "freezing and sweating a lot." Pt has constant nausea and has not been able to eat anything. Reports diarrhea that is watery with an episode of "dark red blood" in his stool today. Pt denies vomiting. Also denies pain w/ urination or blood in urine. Skin is warm and dry. Respirations are even and unlabored. NAD noted.  "

## 2019-12-01 NOTE — ED NOTES
Pt states the pain and nausea are better but still has an uncomfortable feeling of fullness in his lower abdomen.

## 2019-12-02 NOTE — ED PROVIDER NOTES
Encounter Date: 11/30/2019       History     Chief Complaint   Patient presents with    Flank Pain     To ER with c/o fever and left flank pain x 2 days with nausea.  Pt states that he had one dark red stool today.    Fever    Rectal Bleeding     The history is provided by the patient.   Abdominal Pain   The current episode started two days ago. The onset of the illness was gradual. The problem has not changed since onset.The abdominal pain is located in the LLQ. The abdominal pain radiates to the left flank. The other symptoms of the illness include fever (subjective), nausea, diarrhea and hematochezia. The other symptoms of the illness do not include hematemesis.   The patient has had a change in bowel habit. Additional symptoms associated with the illness include back pain. Symptoms associated with the illness do not include hematuria.     Review of patient's allergies indicates:  No Known Allergies  Past Medical History:   Diagnosis Date    Dehydration     Hypokalemia 7/31/2019    Migraine 7/31/2019    Seizure disorder 7/31/2019    Viral gastroenteritis 7/31/2019     No past surgical history on file.  No family history on file.  Social History     Tobacco Use    Smoking status: Never Smoker    Smokeless tobacco: Never Used   Substance Use Topics    Alcohol use: Never     Frequency: Never    Drug use: Never     Review of Systems   Constitutional: Positive for fever (subjective).   Gastrointestinal: Positive for abdominal pain, blood in stool, diarrhea, hematochezia and nausea. Negative for hematemesis.   Genitourinary: Positive for flank pain. Negative for difficulty urinating and hematuria.   Musculoskeletal: Positive for back pain.   All other systems reviewed and are negative.      Physical Exam     Initial Vitals [11/30/19 2233]   BP Pulse Resp Temp SpO2   136/70 92 18 98.7 °F (37.1 °C) 99 %      MAP       --         Physical Exam    Nursing note and vitals reviewed.  Constitutional: He appears  well-developed and well-nourished. He is not diaphoretic. No distress.   HENT:   Head: Normocephalic.   Mouth/Throat: Oropharynx is clear and moist.   Eyes: Conjunctivae and EOM are normal. Pupils are equal, round, and reactive to light.   Neck: Normal range of motion. Neck supple.   Cardiovascular: Normal rate, regular rhythm, normal heart sounds and intact distal pulses.   No murmur heard.  Pulmonary/Chest: Breath sounds normal. No respiratory distress. He has no wheezes. He has no rhonchi. He has no rales.   Abdominal: Soft. He exhibits no distension and no mass. There is no tenderness. There is no rebound and no guarding.   Musculoskeletal: Normal range of motion. He exhibits no edema or tenderness.   Neurological: He is alert and oriented to person, place, and time. He has normal strength.   Skin: Skin is warm and dry. Capillary refill takes less than 2 seconds. No rash noted.   Psychiatric: He has a normal mood and affect.         ED Course   Procedures  Labs Reviewed   CBC W/ AUTO DIFFERENTIAL - Abnormal; Notable for the following components:       Result Value    Mono # 1.1 (*)     Lymph% 16.1 (*)     All other components within normal limits   COMPREHENSIVE METABOLIC PANEL - Abnormal; Notable for the following components:    ALT 47 (*)     All other components within normal limits   URINALYSIS, REFLEX TO URINE CULTURE - Abnormal; Notable for the following components:    Occult Blood UA Trace (*)     All other components within normal limits    Narrative:     Preferred Collection Type->Urine, Clean Catch   LIPASE   OCCULT BLOOD X 1, STOOL          Imaging Results          CT Abdomen Pelvis  Without Contrast (Final result)  Result time 12/01/19 00:43:45    Final result by Cecy Wilcox MD (12/01/19 00:43:45)                 Impression:      No acute findings on CT abdomen and pelvis without contrast.      Electronically signed by: Cecy Wilcox  Date:    12/01/2019  Time:    00:43              Narrative:    EXAMINATION:  CT ABDOMEN PELVIS WITHOUT    CLINICAL HISTORY:  Abdominal pain.    TECHNIQUE:  5 mm unenhanced axial images from the lung bases through the greater trochanters were performed.  Coronal and sagittal reformatted images were provided.    COMPARISON:  07/31/2019    FINDINGS:  Within the limits of a noncontrast examination, the liver, spleen, pancreas, kidneys, and adrenal glands are unremarkable.  The gallbladder contains no calcified gallstones.    There is no gross abdominal adenopathy or ascites. There is a tiny non incarcerated fat containing umbilical hernia.    The appendix is not inflamed.  There are no pelvic masses or adenopathy.  There is no free pelvic fluid.  There are multiple nonenlarged mesenteric lymph nodes right greater than left.    The lung bases are centrally clear.  There is mild pleural thickening or atelectatic change at the lateral most aspect of the right major fissure.                                 Medical Decision Making:   Differential Diagnosis:   Differential Diagnosis includes, but is not limited to:  Bowel obstruction/volvulus, perforated viscous, incarcerated/strangulated hernia, intussusception, ileus, appendicitis, cholecystitis, esophagitis, hepatitis, nephrolithiasis, pancreatitis, gastritis/gastroenteritis, colitis, IBD/IBS, biliary colic, PUD, constipation, UTI/pyelonephritis,  disorder.      Clinical Tests:   Lab Tests: Reviewed and Ordered  Radiological Study: Ordered and Reviewed  ED Management:  Imaging unremarkable with consideration of renal stone given patient's complaints. H&H stable with hemorrhoid on exam an occult negative stool.  Later in visit patient endorses significant diarrhea, suspect viral syndrome with GI symptoms.  Will provide symptomatic care, discussed expected course of illness indications return in follow-up to ensure resolution of symptoms                                 Clinical Impression:       ICD-10-CM ICD-9-CM   1.  Nausea vomiting and diarrhea R11.2 787.91    R19.7 787.01   2. Hemorrhoids, unspecified hemorrhoid type K64.9 455.6   3. Flank pain R10.9 789.09         Disposition:   Disposition: Discharged  Condition: Stable                     Guy J. Lefort, MD  12/02/19 0500

## 2020-06-28 ENCOUNTER — HOSPITAL ENCOUNTER (EMERGENCY)
Facility: HOSPITAL | Age: 25
Discharge: HOME | End: 2020-06-28
Attending: EMERGENCY MEDICINE
Payer: COMMERCIAL

## 2020-06-28 VITALS
OXYGEN SATURATION: 97 % | SYSTOLIC BLOOD PRESSURE: 142 MMHG | TEMPERATURE: 99 F | HEIGHT: 69 IN | RESPIRATION RATE: 18 BRPM | WEIGHT: 198 LBS | BODY MASS INDEX: 29.33 KG/M2 | HEART RATE: 102 BPM | DIASTOLIC BLOOD PRESSURE: 88 MMHG

## 2020-06-28 DIAGNOSIS — B34.9 VIRAL SYNDROME: Primary | ICD-10-CM

## 2020-06-28 LAB
GROUP A STREP, MOLECULAR: NEGATIVE
SARS-COV-2 RDRP RESP QL NAA+PROBE: NEGATIVE

## 2020-06-28 PROCEDURE — 87651 STREP A DNA AMP PROBE: CPT

## 2020-06-28 PROCEDURE — U0002 COVID-19 LAB TEST NON-CDC: HCPCS

## 2020-06-28 PROCEDURE — 25000003 PHARM REV CODE 250: Performed by: NURSE PRACTITIONER

## 2020-06-28 PROCEDURE — 99283 EMERGENCY DEPT VISIT LOW MDM: CPT

## 2020-06-28 RX ORDER — IBUPROFEN 600 MG/1
600 TABLET ORAL EVERY 6 HOURS PRN
Qty: 20 TABLET | Refills: 0 | Status: SHIPPED | OUTPATIENT
Start: 2020-06-28

## 2020-06-28 RX ORDER — IBUPROFEN 600 MG/1
600 TABLET ORAL
Status: COMPLETED | OUTPATIENT
Start: 2020-06-28 | End: 2020-06-28

## 2020-06-28 RX ADMIN — IBUPROFEN 600 MG: 600 TABLET, FILM COATED ORAL at 01:06

## 2020-06-28 NOTE — ED TRIAGE NOTES
Pt presents to ED with C/O sore throat and cough x3 days. Pt also C/O fever yesterday and red eye this am.  Pt states he has been taking OTC medication with no relief. ................Sore Throat (left sore throat x 3-4 days, + red eyes, + cough, + fever )

## 2020-06-28 NOTE — ED PROVIDER NOTES
Encounter Date: 6/28/2020       History     Chief Complaint   Patient presents with    Sore Throat     left sore throat x 3-4 days, + red eyes, + cough, + fever      25-year-old male presents to the ED for sore throat, rhinorrhea, eye redness, occasional dry cough, and fever.  He is concerned that he may have corona virus.  The patient reports that his symptoms started with a sore throat 3-4 days ago and have progressed.  He does report occasionally that his face feels tingly but not currently.  No headache, visual changes, eye drainage, chest pain, shortness of breath, vomiting, or diarrhea.  No rash.  He has been able to tolerate fluids without difficulty.  He denies voice change.  He does not smoke.  He denies history of asthma and pneumonia.  This is the extent of the patient's complaints at this time.    The history is provided by the patient.     Review of patient's allergies indicates:  No Known Allergies  Past Medical History:   Diagnosis Date    Dehydration     Hypokalemia 7/31/2019    Migraine 7/31/2019    Seizure disorder 7/31/2019    Viral gastroenteritis 7/31/2019     No past surgical history on file.  No family history on file.  Social History     Tobacco Use    Smoking status: Never Smoker    Smokeless tobacco: Never Used   Substance Use Topics    Alcohol use: Never     Frequency: Never    Drug use: Never     Review of Systems   Constitutional: Positive for appetite change and fever. Negative for activity change and fatigue.   HENT: Positive for congestion, rhinorrhea and sore throat. Negative for dental problem, drooling, facial swelling, nosebleeds, trouble swallowing and voice change.    Eyes: Positive for redness. Negative for photophobia, pain and visual disturbance.   Respiratory: Positive for cough. Negative for chest tightness and shortness of breath.    Cardiovascular: Negative for chest pain.   Gastrointestinal: Negative for abdominal pain, diarrhea, nausea and vomiting.    Musculoskeletal: Positive for myalgias. Negative for back pain, joint swelling and neck pain.   Skin: Negative.    Allergic/Immunologic: Negative for immunocompromised state.   Neurological: Negative for dizziness, weakness, light-headedness and headaches.   Psychiatric/Behavioral: Negative for confusion.   All other systems reviewed and are negative.      Physical Exam     Initial Vitals [06/28/20 1320]   BP Pulse Resp Temp SpO2   (!) 142/88 102 18 99.1 °F (37.3 °C) 97 %      MAP       --         Physical Exam    Nursing note and vitals reviewed.  Constitutional: He appears well-developed and well-nourished. He is active and cooperative. He is easily aroused.  Non-toxic appearance. He does not have a sickly appearance. He does not appear ill. No distress.   HENT:   Head: Normocephalic and atraumatic.   Nose: Rhinorrhea present.   Mouth/Throat: Uvula is midline and mucous membranes are normal. No trismus in the jaw. No dental abscesses or uvula swelling. Posterior oropharyngeal erythema present. No oropharyngeal exudate, posterior oropharyngeal edema or tonsillar abscesses.   Postnasal drip.  No tonsillar exudates.  Uvula midline.  Managing secretions without difficulty.   Eyes: Conjunctivae, EOM and lids are normal.   Neck: Trachea normal, normal range of motion, full passive range of motion without pain and phonation normal. Neck supple.   Cardiovascular: Normal rate, regular rhythm and normal heart sounds.   Pulmonary/Chest: Effort normal and breath sounds normal.   Abdominal: Soft. Normal appearance and bowel sounds are normal. He exhibits no distension. There is no abdominal tenderness. There is no rigidity, no rebound, no guarding and no CVA tenderness.   Neurological: He is alert, oriented to person, place, and time and easily aroused. He has normal strength. Coordination normal. GCS eye subscore is 4. GCS verbal subscore is 5. GCS motor subscore is 6.   Skin: Skin is warm, dry and intact. No bruising and  no rash noted. No pallor.   Psychiatric: He has a normal mood and affect. His speech is normal and behavior is normal. Judgment and thought content normal. Cognition and memory are normal.         ED Course   Procedures  Labs Reviewed   GROUP A STREP, MOLECULAR   SARS-COV-2 RNA AMPLIFICATION, QUAL          Imaging Results    None          Medical Decision Making:   Differential Diagnosis:   Viral, URI, allergies, irritants, COVID, strep throat  Clinical Tests:   Lab Tests: Ordered and Reviewed  ED Management:  COVID negative.  Rapid strep negative.  The patient's symptoms are due to viral illness.  Due to symptoms I did review need for self quarantine as recommended by CDC guidelines.    Patient's vitals do not suggest sepsis.  No meningeal signs.  He denies headache at the time of discharge.    Encouraged symptomatic care.  Pt to follow up with PCP within 2 days.  I reviewed strict return precautions. In addition, pt is to return to the ED if condition changes, progresses, or if there are any concerns.  Pt verbalized understanding, compliance, and agreement with the treatment plan.                                     Clinical Impression:       ICD-10-CM ICD-9-CM   1. Viral syndrome  B34.9 079.99                                Crystal Marie, JOSE  06/28/20 2242

## 2020-07-05 ENCOUNTER — HOSPITAL ENCOUNTER (EMERGENCY)
Facility: HOSPITAL | Age: 25
Discharge: HOME | End: 2020-07-05
Attending: EMERGENCY MEDICINE
Payer: COMMERCIAL

## 2020-07-05 VITALS
HEART RATE: 104 BPM | SYSTOLIC BLOOD PRESSURE: 137 MMHG | DIASTOLIC BLOOD PRESSURE: 89 MMHG | HEIGHT: 69 IN | OXYGEN SATURATION: 100 % | WEIGHT: 190 LBS | TEMPERATURE: 98 F | RESPIRATION RATE: 20 BRPM | BODY MASS INDEX: 28.14 KG/M2

## 2020-07-05 DIAGNOSIS — J02.9 SORE THROAT: ICD-10-CM

## 2020-07-05 DIAGNOSIS — R09.A2 GLOBUS SENSATION: Primary | ICD-10-CM

## 2020-07-05 DIAGNOSIS — R07.9 CHEST PAIN: ICD-10-CM

## 2020-07-05 LAB — GROUP A STREP, MOLECULAR: NEGATIVE

## 2020-07-05 PROCEDURE — 93005 ELECTROCARDIOGRAM TRACING: CPT

## 2020-07-05 PROCEDURE — U0003 INFECTIOUS AGENT DETECTION BY NUCLEIC ACID (DNA OR RNA); SEVERE ACUTE RESPIRATORY SYNDROME CORONAVIRUS 2 (SARS-COV-2) (CORONAVIRUS DISEASE [COVID-19]), AMPLIFIED PROBE TECHNIQUE, MAKING USE OF HIGH THROUGHPUT TECHNOLOGIES AS DESCRIBED BY CMS-2020-01-R: HCPCS

## 2020-07-05 PROCEDURE — 25000003 PHARM REV CODE 250: Performed by: NURSE PRACTITIONER

## 2020-07-05 PROCEDURE — 87651 STREP A DNA AMP PROBE: CPT

## 2020-07-05 PROCEDURE — 99284 EMERGENCY DEPT VISIT MOD MDM: CPT | Mod: 25

## 2020-07-05 RX ORDER — SUCRALFATE 1 G/10ML
1 SUSPENSION ORAL
Qty: 280 ML | Refills: 0 | Status: SHIPPED | OUTPATIENT
Start: 2020-07-05 | End: 2020-07-12

## 2020-07-05 RX ADMIN — LIDOCAINE HYDROCHLORIDE: 20 SOLUTION ORAL; TOPICAL at 07:07

## 2020-07-06 NOTE — DISCHARGE INSTRUCTIONS
Thank you for allowing me to care for you today.  I hope our treatment plan will make you feel better in the next few days.  In order for me to take better care of my future patients and improve our Emergency Department, I would appreciate if you can provide us with feedback.  In the next few days, you may receive a survey in the mail.  If you do, it would mean a great deal to me if you would please take the time to complete it.    Thank you and I hope you feel better.  Diane Blackman NP

## 2020-07-06 NOTE — ED PROVIDER NOTES
"Encounter Date: 7/5/2020       History     Chief Complaint   Patient presents with    Sore Throat     25y M ambulatory to ED with c/o pain in throat, feels like a "ball" is in his throat and is causing right chest discomfort. pt currently on amoxicillin     The patient is a 25-year-old male with a past medical history of gastroenteritis, seizure disorder, migraines, hypokalemia, and dehydration who presents to the ED complaining of a sore throat that has been persistent for approximately the past week.  He reports feeling a ball in his throat that makes it hard to swallow.  He is also experiencing left and right-sided chest pain.  Denies shortness of breath.  No fevers, nausea, vomiting, constipation, or diarrhea.  He is tolerating oral intake.  Currently taking amoxicillin prescribed by a family member.  No other treatment attempted prior to arrival.    The history is provided by the patient.     Review of patient's allergies indicates:  No Known Allergies  Past Medical History:   Diagnosis Date    Dehydration     Hypokalemia 7/31/2019    Migraine 7/31/2019    Seizure disorder 7/31/2019    Viral gastroenteritis 7/31/2019     No past surgical history on file.  No family history on file.  Social History     Tobacco Use    Smoking status: Never Smoker    Smokeless tobacco: Never Used   Substance Use Topics    Alcohol use: Never     Frequency: Never    Drug use: Never     Review of Systems   Constitutional: Negative for fever.   HENT: Positive for sore throat.    Respiratory: Negative for cough and shortness of breath.    Cardiovascular: Positive for chest pain. Negative for leg swelling.   Gastrointestinal: Negative for nausea and vomiting.   Genitourinary: Negative for dysuria.   Musculoskeletal: Negative for back pain.   Skin: Negative for rash.   Neurological: Negative for dizziness, weakness and headaches.   Hematological: Does not bruise/bleed easily.       Physical Exam     Initial Vitals [07/05/20 " 1900]   BP Pulse Resp Temp SpO2   137/89 104 20 98.4 °F (36.9 °C) 100 %      MAP       --         Physical Exam    Nursing note and vitals reviewed.  Constitutional: He appears well-developed and well-nourished.  Non-toxic appearance. No distress.   The patient is alert, oriented, and speaking in complete sentences.  No apparent distress.  He is nontoxic-appearing.  No voice changes.  Tolerating secretions without difficulty.   HENT:   Head: Normocephalic and atraumatic.   Right Ear: Hearing and external ear normal.   Left Ear: Hearing and external ear normal.   Nose: Nose abnormal.   Mouth/Throat: Uvula is midline, oropharynx is clear and moist and mucous membranes are normal.   There is no oropharyngeal edema or erythema.  No meningismus.  No evidence of retropharyngeal or peritonsillar abscess on exam.   Eyes: Conjunctivae and EOM are normal.   Neck: Full passive range of motion without pain. Neck supple.   Cardiovascular: Normal rate, regular rhythm, normal heart sounds and normal pulses. Exam reveals no gallop and no friction rub.    No murmur heard.  Pulmonary/Chest: Effort normal. No respiratory distress. He has no rhonchi.   Musculoskeletal: Normal range of motion.   Neurological: He is alert and oriented to person, place, and time. He has normal strength. Gait normal. GCS eye subscore is 4. GCS verbal subscore is 5. GCS motor subscore is 6.   Skin: Skin is warm, dry and intact. Capillary refill takes less than 2 seconds. No rash noted.   Psychiatric: He has a normal mood and affect. His speech is normal and behavior is normal. Judgment and thought content normal. Cognition and memory are normal.         ED Course   Procedures  Labs Reviewed   GROUP A STREP, MOLECULAR   SARS-COV-2 (COVID-19) QUALITATIVE PCR          Imaging Results    None          Medical Decision Making:   History:   Old Medical Records: I decided to obtain old medical records.  Clinical Tests:   Lab Tests: Ordered and  Reviewed  Radiological Study: Ordered and Reviewed  Medical Tests: Ordered and Reviewed  ED Management:  The patient is a 25-year-old male who presents to the ED for further evaluation of sore throat, chest pain, and ball in his throat.    Physical exam is unremarkable.    EKG does not demonstrate evidence of acute ischemia or dysrhythmia.    Chest x-ray is clear.    Group a strep is negative.    COVID pending.    Patient reports significant relief of symptoms following GI cocktail.    Based on history and physical exam, as well as diagnostic results, I considered but do not suspect COVID infection, pneumonia, cardiac dysrhythmia, peritonsillar abscess, retropharyngeal abscess, or streptococcal pharyngitis.  Clinical presentation suggests patient likely suffers from a globus sensation caused by acid reflux.  I will discharge with prescriptions for Zantac and Carafate.  Ambulatory referral has been placed for PCP follow-up.  All questions answered.  Return precautions given.                                 Clinical Impression:       ICD-10-CM ICD-9-CM   1. Globus sensation  R09.89 306.4   2. Chest pain  R07.9 786.50   3. Sore throat  J02.9 462                                Diane Blackman NP  07/05/20 2025

## 2020-07-07 LAB — SARS-COV-2 RNA RESP QL NAA+PROBE: NOT DETECTED

## 2020-08-08 ENCOUNTER — HOSPITAL ENCOUNTER (EMERGENCY)
Facility: HOSPITAL | Age: 25
Discharge: HOME | End: 2020-08-08
Attending: EMERGENCY MEDICINE
Payer: COMMERCIAL

## 2020-08-08 VITALS
TEMPERATURE: 97 F | OXYGEN SATURATION: 99 % | DIASTOLIC BLOOD PRESSURE: 71 MMHG | SYSTOLIC BLOOD PRESSURE: 128 MMHG | HEART RATE: 100 BPM | HEIGHT: 69 IN | BODY MASS INDEX: 28.14 KG/M2 | RESPIRATION RATE: 30 BRPM | WEIGHT: 190 LBS

## 2020-08-08 DIAGNOSIS — U07.1 COVID-19 VIRUS DETECTED: ICD-10-CM

## 2020-08-08 DIAGNOSIS — R05.9 COUGH: ICD-10-CM

## 2020-08-08 DIAGNOSIS — U07.1 COVID-19 VIRUS INFECTION: Primary | ICD-10-CM

## 2020-08-08 DIAGNOSIS — R07.9 CHEST PAIN: ICD-10-CM

## 2020-08-08 LAB — SARS-COV-2 RDRP RESP QL NAA+PROBE: POSITIVE

## 2020-08-08 PROCEDURE — 96372 THER/PROPH/DIAG INJ SC/IM: CPT

## 2020-08-08 PROCEDURE — U0002 COVID-19 LAB TEST NON-CDC: HCPCS

## 2020-08-08 PROCEDURE — 93005 ELECTROCARDIOGRAM TRACING: CPT

## 2020-08-08 PROCEDURE — 99284 EMERGENCY DEPT VISIT MOD MDM: CPT | Mod: 25

## 2020-08-08 PROCEDURE — 63600175 PHARM REV CODE 636 W HCPCS: Performed by: EMERGENCY MEDICINE

## 2020-08-08 RX ORDER — PROCHLORPERAZINE EDISYLATE 5 MG/ML
10 INJECTION INTRAMUSCULAR; INTRAVENOUS
Status: COMPLETED | OUTPATIENT
Start: 2020-08-08 | End: 2020-08-08

## 2020-08-08 RX ORDER — BENZONATATE 100 MG/1
100 CAPSULE ORAL EVERY 8 HOURS PRN
Qty: 14 CAPSULE | Refills: 0 | Status: SHIPPED | OUTPATIENT
Start: 2020-08-08 | End: 2020-08-18

## 2020-08-08 RX ORDER — KETOROLAC TROMETHAMINE 30 MG/ML
30 INJECTION, SOLUTION INTRAMUSCULAR; INTRAVENOUS
Status: COMPLETED | OUTPATIENT
Start: 2020-08-08 | End: 2020-08-08

## 2020-08-08 RX ORDER — ONDANSETRON 4 MG/1
4 TABLET, ORALLY DISINTEGRATING ORAL EVERY 6 HOURS PRN
Qty: 10 TABLET | Refills: 0 | OUTPATIENT
Start: 2020-08-08 | End: 2021-07-19

## 2020-08-08 RX ADMIN — PROCHLORPERAZINE EDISYLATE 10 MG: 5 INJECTION INTRAMUSCULAR; INTRAVENOUS at 03:08

## 2020-08-08 RX ADMIN — KETOROLAC TROMETHAMINE 30 MG: 30 INJECTION, SOLUTION INTRAMUSCULAR at 03:08

## 2020-08-08 NOTE — Clinical Note
"Flo"Yris Wayne was seen and treated in our emergency department on 8/8/2020.     COVID-19 is present in our communities across the state. There is limited testing for COVID at this time, so not all patients can be tested. In this situation, your employee meets the following criteria:    Flo Wayne has met the criteria for COVID-19 testing based upon symptoms, travel, and/or potential exposure. The test has been completed and is pending results at this time. During this time the employee is not able to work and should be quarantined per the Centers for Disease Control timelines.     If you have any questions or concerns, or if I can be of further assistance, please do not hesitate to contact me.    Sincerely,             Tonny Lewis MD"

## 2020-08-08 NOTE — ED PROVIDER NOTES
"Encounter Date: 8/8/2020       History     Chief Complaint   Patient presents with    Fever     pt presents to ED today c/o fever, SOB, "burning in lungs", and non productive cough x 3 days after old AC unit that was moved from Apartment. He reports air tubing fell out and hit him in the face. reports temp of 101.7 this am, reports taking tylenol this am     Shortness of Breath    Cough     25-year-old male presents emergency department complaining of a few days of cough, congestion, chest pain, fever.  Patient initially reports shortness of breath.  However, on further evaluation patient states he is not oxygen hungry, but has pain with deep inspiration.  Notes cough is mostly productive of whitish sputum.  States he was helping to remove an air conditioning unit at his residence a few days ago and a to covered in insulation hit him in the face and he believes he may have inhaled some of it.  Since that time he has had gradually worsening cough.  Notes coughing also elicits a diffuse aching chest pain.  States he has been taking Tylenol that is helped with his fever, which was as high as 101.7° orally this morning.  He also reports some nausea but denies any emesis.  Denies any headache, lightheadedness, dizziness, abdominal pain, or any other symptoms at this time.         Review of patient's allergies indicates:  No Known Allergies  Past Medical History:   Diagnosis Date    Dehydration     Hypokalemia 7/31/2019    Migraine 7/31/2019    Seizure disorder 7/31/2019    Viral gastroenteritis 7/31/2019     No past surgical history on file.  No family history on file.  Social History     Tobacco Use    Smoking status: Never Smoker    Smokeless tobacco: Never Used   Substance Use Topics    Alcohol use: Never     Frequency: Never    Drug use: Never     Review of Systems   Constitutional: Positive for fatigue and fever. Negative for chills.   HENT: Positive for congestion.    Respiratory: Positive for cough. " Negative for shortness of breath.    Cardiovascular: Positive for chest pain.   Gastrointestinal: Positive for nausea. Negative for abdominal pain, diarrhea and vomiting.   Musculoskeletal: Negative for back pain, neck pain and neck stiffness.   Neurological: Negative for light-headedness, numbness and headaches.       Physical Exam     Initial Vitals   BP Pulse Resp Temp SpO2   08/08/20 1410 08/08/20 1410 08/08/20 1410 08/08/20 1409 08/08/20 1410   133/77 96 20 96.8 °F (36 °C) 98 %      MAP       --                Physical Exam    Vitals reviewed.  Constitutional: He appears well-developed and well-nourished. No distress.   HENT:   Head: Normocephalic and atraumatic.   Eyes: Conjunctivae and EOM are normal. Pupils are equal, round, and reactive to light.   Neck: Normal range of motion. Neck supple. No tracheal deviation present.   Cardiovascular: Normal rate and intact distal pulses.   Pulmonary/Chest: No respiratory distress.   Musculoskeletal: Normal range of motion. No tenderness or edema.   Neurological: He is alert and oriented to person, place, and time. He has normal strength. GCS score is 15. GCS eye subscore is 4. GCS verbal subscore is 5. GCS motor subscore is 6.   Skin: Skin is warm and dry.         ED Course   Procedures  Labs Reviewed - No data to display  EKG Readings: (Independently Interpreted)   Initial Reading: No STEMI. Previous EKG: Compared with most recent EKG Previous EKG Date: 7/5/2020 (Minimal change). Rhythm: Sinus Tachycardia. Heart Rate: 101. Ectopy: No Ectopy. Conduction: Normal. ST Segments: Normal ST Segments. T Waves: Normal. Axis: Normal.         X-Rays:   Independently Interpreted Readings:   Other Readings:  Imaging interpreted by radiologist and visualized by me:    Imaging Results          X-Ray Chest AP Portable (Final result)  Result time 08/08/20 15:35:27    Final result by Norris Cueva MD (08/08/20 15:35:27)                 Impression:      1. Mildly coarse  interstitial attenuation, nonspecific, no large focal consolidation.      Electronically signed by: Norris Cueva MD  Date:    08/08/2020  Time:    15:35             Narrative:    EXAMINATION:  XR CHEST AP PORTABLE    CLINICAL HISTORY:  Cough    TECHNIQUE:  Single frontal view of the chest was performed.    COMPARISON:  07/05/2020    FINDINGS:  The cardiomediastinal silhouette is not enlarged.  There is no pleural effusion.  The trachea is midline.  The lungs are symmetrically expanded bilaterally with coarse interstitial attenuation, similar to the previous exam.  No large focal consolidation seen.  There is no pneumothorax.  The osseous structures are unremarkable.                                Medical Decision Making:   Initial Assessment:   25-year-old male presents emergency department complaining of cough, congestion, chest pain, fever  Differential Diagnosis:   URI, sinusitis, pneumonia, COVID  Independently Interpreted Test(s):   I have ordered and independently interpreted X-rays - see prior notes.  I have ordered and independently interpreted EKG Reading(s) - see prior notes  Clinical Tests:   Lab Tests: Reviewed       <> Summary of Lab: COVID positive  ED Management:  Informed patient of results.  He was given some Toradol and Compazine intramuscularly and reports significant improvement in his symptoms.  He is resting comfortably with a normal respiratory rate.  We discussed disposition including discharge with prescription for Reglan and Tessalon, home management techniques, self quarantine procedures, and strict return precautions.  Patient expressed good understanding and is comfortable with discharge at this time.                                 Clinical Impression:       ICD-10-CM ICD-9-CM   1. COVID-19 virus infection  U07.1    2. Chest pain  R07.9 786.50   3. Cough  R05 786.2         Disposition:   Disposition: Discharged  Condition: Stable                        Tonny Lewis  MD  08/08/20 9822

## 2020-08-08 NOTE — DISCHARGE INSTRUCTIONS
Please make sure your drinking plenty of fluids such as Gatorade G2 or Powerade light.  I recommend you begin taking ibuprofen 600 mg every 8 hr with food and/or Tylenol 650 mg every 8 hr as needed for pain.  You may also want to consider over-the-counter medications such as Nasonex and/or Mucinex.  Please self quarantine for 2 weeks.  I recommend checking the website [place].gov for [place] specific recommendations. Please return to the emergency room with any worsening symptoms, particularly shortness of breath.

## 2020-08-09 ENCOUNTER — HOSPITAL ENCOUNTER (EMERGENCY)
Facility: HOSPITAL | Age: 25
Discharge: HOME | End: 2020-08-09
Attending: EMERGENCY MEDICINE
Payer: COMMERCIAL

## 2020-08-09 ENCOUNTER — NURSE TRIAGE (OUTPATIENT)
Dept: ADMINISTRATIVE | Facility: CLINIC | Age: 25
End: 2020-08-09

## 2020-08-09 VITALS
OXYGEN SATURATION: 100 % | SYSTOLIC BLOOD PRESSURE: 114 MMHG | TEMPERATURE: 99 F | DIASTOLIC BLOOD PRESSURE: 73 MMHG | WEIGHT: 198 LBS | RESPIRATION RATE: 20 BRPM | BODY MASS INDEX: 29.33 KG/M2 | HEART RATE: 98 BPM | HEIGHT: 69 IN

## 2020-08-09 DIAGNOSIS — R06.02 SHORTNESS OF BREATH: ICD-10-CM

## 2020-08-09 DIAGNOSIS — U07.1 COVID-19: Primary | ICD-10-CM

## 2020-08-09 LAB
ALBUMIN SERPL BCP-MCNC: 3.7 G/DL (ref 3.5–5.2)
ALP SERPL-CCNC: 79 U/L (ref 55–135)
ALT SERPL W/O P-5'-P-CCNC: 45 U/L (ref 10–44)
ANION GAP SERPL CALC-SCNC: 9 MMOL/L (ref 8–16)
AST SERPL-CCNC: 25 U/L (ref 10–40)
BASOPHILS # BLD AUTO: 0.03 K/UL (ref 0–0.2)
BASOPHILS NFR BLD: 0.5 % (ref 0–1.9)
BILIRUB SERPL-MCNC: 0.5 MG/DL (ref 0.1–1)
BUN SERPL-MCNC: 7 MG/DL (ref 6–20)
CALCIUM SERPL-MCNC: 8.6 MG/DL (ref 8.7–10.5)
CHLORIDE SERPL-SCNC: 106 MMOL/L (ref 95–110)
CK SERPL-CCNC: 193 U/L (ref 20–200)
CO2 SERPL-SCNC: 24 MMOL/L (ref 23–29)
CREAT SERPL-MCNC: 1.3 MG/DL (ref 0.5–1.4)
CRP SERPL-MCNC: 13.3 MG/L (ref 0–8.2)
D DIMER PPP IA.FEU-MCNC: 0.58 MG/L FEU
DIFFERENTIAL METHOD: ABNORMAL
EOSINOPHIL # BLD AUTO: 0.1 K/UL (ref 0–0.5)
EOSINOPHIL NFR BLD: 1.2 % (ref 0–8)
ERYTHROCYTE [DISTWIDTH] IN BLOOD BY AUTOMATED COUNT: 12.4 % (ref 11.5–14.5)
EST. GFR  (AFRICAN AMERICAN): >60 ML/MIN/1.73 M^2
EST. GFR  (NON AFRICAN AMERICAN): >60 ML/MIN/1.73 M^2
FERRITIN SERPL-MCNC: 197 NG/ML (ref 20–300)
GLUCOSE SERPL-MCNC: 111 MG/DL (ref 70–110)
HCT VFR BLD AUTO: 41.5 % (ref 40–54)
HGB BLD-MCNC: 14.2 G/DL (ref 14–18)
IMM GRANULOCYTES # BLD AUTO: 0.02 K/UL (ref 0–0.04)
IMM GRANULOCYTES NFR BLD AUTO: 0.3 % (ref 0–0.5)
LACTATE SERPL-SCNC: 1.1 MMOL/L (ref 0.5–2.2)
LDH SERPL L TO P-CCNC: 162 U/L (ref 110–260)
LYMPHOCYTES # BLD AUTO: 0.7 K/UL (ref 1–4.8)
LYMPHOCYTES NFR BLD: 10 % (ref 18–48)
MCH RBC QN AUTO: 29.7 PG (ref 27–31)
MCHC RBC AUTO-ENTMCNC: 34.2 G/DL (ref 32–36)
MCV RBC AUTO: 87 FL (ref 82–98)
MONOCYTES # BLD AUTO: 1 K/UL (ref 0.3–1)
MONOCYTES NFR BLD: 14.9 % (ref 4–15)
NEUTROPHILS # BLD AUTO: 4.8 K/UL (ref 1.8–7.7)
NEUTROPHILS NFR BLD: 73.1 % (ref 38–73)
NRBC BLD-RTO: 0 /100 WBC
PLATELET # BLD AUTO: 234 K/UL (ref 150–350)
PMV BLD AUTO: 9.1 FL (ref 9.2–12.9)
POTASSIUM SERPL-SCNC: 3.5 MMOL/L (ref 3.5–5.1)
PROT SERPL-MCNC: 6.6 G/DL (ref 6–8.4)
RBC # BLD AUTO: 4.78 M/UL (ref 4.6–6.2)
SODIUM SERPL-SCNC: 139 MMOL/L (ref 136–145)
TROPONIN I SERPL DL<=0.01 NG/ML-MCNC: <0.006 NG/ML (ref 0–0.03)
WBC # BLD AUTO: 6.53 K/UL (ref 3.9–12.7)

## 2020-08-09 PROCEDURE — 82550 ASSAY OF CK (CPK): CPT

## 2020-08-09 PROCEDURE — 82728 ASSAY OF FERRITIN: CPT

## 2020-08-09 PROCEDURE — 93005 ELECTROCARDIOGRAM TRACING: CPT

## 2020-08-09 PROCEDURE — 96361 HYDRATE IV INFUSION ADD-ON: CPT

## 2020-08-09 PROCEDURE — 96374 THER/PROPH/DIAG INJ IV PUSH: CPT | Mod: 59

## 2020-08-09 PROCEDURE — 63600175 PHARM REV CODE 636 W HCPCS: Performed by: EMERGENCY MEDICINE

## 2020-08-09 PROCEDURE — 85379 FIBRIN DEGRADATION QUANT: CPT

## 2020-08-09 PROCEDURE — 80053 COMPREHEN METABOLIC PANEL: CPT

## 2020-08-09 PROCEDURE — 99285 EMERGENCY DEPT VISIT HI MDM: CPT | Mod: 25

## 2020-08-09 PROCEDURE — 83615 LACTATE (LD) (LDH) ENZYME: CPT

## 2020-08-09 PROCEDURE — 85025 COMPLETE CBC W/AUTO DIFF WBC: CPT

## 2020-08-09 PROCEDURE — 86140 C-REACTIVE PROTEIN: CPT

## 2020-08-09 PROCEDURE — 25000003 PHARM REV CODE 250: Performed by: EMERGENCY MEDICINE

## 2020-08-09 PROCEDURE — 84484 ASSAY OF TROPONIN QUANT: CPT

## 2020-08-09 PROCEDURE — 83605 ASSAY OF LACTIC ACID: CPT

## 2020-08-09 PROCEDURE — 25500020 PHARM REV CODE 255: Performed by: EMERGENCY MEDICINE

## 2020-08-09 RX ORDER — KETOROLAC TROMETHAMINE 30 MG/ML
15 INJECTION, SOLUTION INTRAMUSCULAR; INTRAVENOUS
Status: COMPLETED | OUTPATIENT
Start: 2020-08-09 | End: 2020-08-09

## 2020-08-09 RX ORDER — ALBUTEROL SULFATE 90 UG/1
1-2 AEROSOL, METERED RESPIRATORY (INHALATION) EVERY 6 HOURS PRN
Qty: 18 G | Refills: 0 | Status: SHIPPED | OUTPATIENT
Start: 2020-08-09

## 2020-08-09 RX ADMIN — IOHEXOL 100 ML: 350 INJECTION, SOLUTION INTRAVENOUS at 05:08

## 2020-08-09 RX ADMIN — KETOROLAC TROMETHAMINE 15 MG: 30 INJECTION, SOLUTION INTRAMUSCULAR at 03:08

## 2020-08-09 RX ADMIN — SODIUM CHLORIDE 1000 ML: 0.9 INJECTION, SOLUTION INTRAVENOUS at 01:08

## 2020-08-09 NOTE — DISCHARGE INSTRUCTIONS
Please make sure you are drinking plenty of fluids.  Please continue your home management, return with any new or worsening symptoms.

## 2020-08-09 NOTE — ED NOTES
Present to ED with SOB. Reports SOB, chest pain , cough and fever since yesterday. Pt diagnosed with Covid 19 on yesterday. Temp at home 102.5 at home. Pt alternating Tylenol and Ibuprofen with no relief from elevated temp. Denies n/d but reports some intermittent diarrhea. Pt able to speak in complete sentence. No acute distress.

## 2020-08-09 NOTE — TELEPHONE ENCOUNTER
Pt calling that he found out yesterday that he has COVID and he said that he was unable to get temp down the last 5 hours has take tylenol and advil and as I called he was in the shower taking cold shower, he said that he was having to hold himself up because he felt so weak thought might pass out. Pt instructed to go back to the ED    Reason for Disposition   Patient sounds very sick or weak to the triager    Additional Information   Negative: SEVERE difficulty breathing (e.g., struggling for each breath, speaks in single words)   Negative: Difficult to awaken or acting confused (e.g., disoriented, slurred speech)   Negative: Bluish (or gray) lips or face now   Negative: Shock suspected (e.g., cold/pale/clammy skin, too weak to stand, low BP, rapid pulse)   Negative: SEVERE or constant chest pain or pressure (Exception: mild central chest pain, present only when coughing)   Negative: MODERATE difficulty breathing (e.g., speaks in phrases, SOB even at rest, pulse 100-120)   Negative: [1] COVID-19 exposure AND [2] NO symptoms   Negative: COVID-19 and Breastfeeding, questions about   Negative: [1] Adult with possible COVID-19 symptoms AND [2] triager concerned about severity of symptoms or other causes   Negative: Sounds like a life-threatening emergency to the triager    Protocols used: CORONAVIRUS (COVID-19) DIAGNOSED OR [id number]

## 2020-08-09 NOTE — ED PROVIDER NOTES
Encounter Date: 8/9/2020       History     Chief Complaint   Patient presents with    Shortness of Breath     Pt presents to ED today c/o SOB and temp of 102.5 after taking tylenol 500mg an ibuprofen 600mg. pt reports he was seen yesterday and diagnosed with COVID-19      25-year-old male diagnosed with coronavirus by me yesterday returns complaining of fever, fatigue, chest pain states he had trouble sleeping because he felt palpitations and chest pain and shortness of breath overnight.  States this morning he was unable to get his temperature below 102° despite taking ibuprofen and Tylenol.  Notes persistent albeit mild cough.  Notes some mild shortness of breath, worse when lying supine and better with sitting up.  Exacerbating alleviating factors reported.  Other symptoms reported at this time.        Review of patient's allergies indicates:  No Known Allergies  Past Medical History:   Diagnosis Date    Dehydration     Hypokalemia 7/31/2019    Migraine 7/31/2019    Seizure disorder 7/31/2019    Viral gastroenteritis 7/31/2019     No past surgical history on file.  No family history on file.  Social History     Tobacco Use    Smoking status: Never Smoker    Smokeless tobacco: Never Used   Substance Use Topics    Alcohol use: Never     Frequency: Never    Drug use: Never     Review of Systems   Constitutional: Positive for fatigue and fever. Negative for chills.   HENT: Negative for congestion.    Respiratory: Positive for cough and shortness of breath.    Cardiovascular: Positive for chest pain and palpitations.   Gastrointestinal: Negative for abdominal pain, diarrhea and vomiting.   Musculoskeletal: Negative for back pain, neck pain and neck stiffness.   Neurological: Negative for light-headedness, numbness and headaches.       Physical Exam     Initial Vitals [08/09/20 1318]   BP Pulse Resp Temp SpO2   127/72 (!) 140 20 (!) 102.5 °F (39.2 °C) 96 %      MAP       --         Physical Exam    Nursing  note and vitals reviewed.  Constitutional: He appears well-developed and well-nourished. No distress.   HENT:   Head: Normocephalic and atraumatic.   Eyes: Conjunctivae and EOM are normal. Pupils are equal, round, and reactive to light.   Neck: Normal range of motion. Neck supple. No tracheal deviation present.   Cardiovascular: Regular rhythm and intact distal pulses.   Tachycardic    Pulmonary/Chest: No respiratory distress.   Respirations even and unlabored; Speaks full sentences without difficulty    Musculoskeletal: Normal range of motion. No tenderness or edema.   Neurological: He is alert and oriented to person, place, and time. He has normal strength. No cranial nerve deficit. GCS score is 15. GCS eye subscore is 4. GCS verbal subscore is 5. GCS motor subscore is 6.   Skin: Skin is warm and dry.         ED Course   Procedures  Labs Reviewed   CBC W/ AUTO DIFFERENTIAL - Abnormal; Notable for the following components:       Result Value    MPV 9.1 (*)     Lymph # 0.7 (*)     Gran% 73.1 (*)     Lymph% 10.0 (*)     All other components within normal limits   COMPREHENSIVE METABOLIC PANEL - Abnormal; Notable for the following components:    Glucose 111 (*)     Calcium 8.6 (*)     ALT 45 (*)     All other components within normal limits   C-REACTIVE PROTEIN - Abnormal; Notable for the following components:    CRP 13.3 (*)     All other components within normal limits   FERRITIN   CK   LACTIC ACID, PLASMA   TROPONIN I   D DIMER, QUANTITATIVE   LACTATE DEHYDROGENASE     EKG Readings: (Independently Interpreted)   Initial Reading: No STEMI. Previous EKG: Compared with most recent EKG Previous EKG Date: 8/8/2020 (Minimal change). Rhythm: Sinus Tachycardia. Heart Rate: 123. Ectopy: No Ectopy. Conduction: Normal. ST Segments: Normal ST Segments. T Waves: Normal. Axis: Normal.         X-Rays:   Independently Interpreted Readings:   Other Readings:  Chest x-ray interpreted by radiologist and visualized by me:    Imaging  Results          CTA Chest Non-Coronary (PE Study) (Final result)  Result time 08/09/20 17:39:03    Final result by Cecy Wilcox MD (08/09/20 17:39:03)                 Impression:      No evidence of acute pulmonary embolism.  Focal area of ground-glass opacity on the right lower lobe, which is concerning for a pneumonic infiltrate.      Electronically signed by: Cecy Wilcox  Date:    08/09/2020  Time:    17:39             Narrative:    EXAMINATION:  CT PULMONARY ANGIOGRAM WITH CONTRAST    CLINICAL HISTORY:  Shortness of breath, chest pain, cough and fever since yesterday.  Patient diagnosed with COVID-19 1 yesterday.    TECHNIQUE:  CT of the chest with intravenous contrast for pulmonary artery angiogram was performed. Contiguous axial 1.25 mm images followed by 10 mm reconstructions with multiplanar and MIP reformations of the pulmonary arteries. No 3D post-angiographic imaging was performed on an independent workstation and reviewed.  One hundred ml of Omnipaque 350 was injected.    COMPARISON:  None.    FINDINGS:  There is no evidence of pulmonary artery filling defect to suggest pulmonary embolism. There is no aortic aneurysm or aortic dissection.    There is a focal area of ground-glass opacity in the right lower lobe.  Mild bibasilar atelectatic changes are present.    There is no evidence of mediastinal, hilar, or axillary adenopathy.    There is no pleural or pericardial effusion.    The heart size is within normal limits.                               X-Ray Chest AP Portable (Final result)  Result time 08/09/20 14:01:20    Final result by Norris Cueva MD (08/09/20 14:01:20)                 Impression:      1. No acute cardiopulmonary process noting hypoventilatory exam.      Electronically signed by: Norris Cueva MD  Date:    08/09/2020  Time:    14:01             Narrative:    EXAMINATION:  XR CHEST AP PORTABLE    CLINICAL HISTORY:  Shortness of breath;    TECHNIQUE:  Single frontal  view of the chest was performed.    COMPARISON:  08/08/2020    FINDINGS:  The cardiomediastinal silhouette is not enlarged.  There is no pleural effusion.  The trachea is midline.  The lungs are symmetrically expanded bilaterally without evidence of acute parenchymal process. No large focal consolidation seen.  There is no pneumothorax.  The osseous structures are unremarkable.                                    Medical Decision Making:   Initial Assessment:   25-year-old male presents emergency department complaining chest pain, shortness breath, fever  Differential Diagnosis:   Worsening COVID status, pulmonary embolism, dehydration,  Independently Interpreted Test(s):   I have ordered and independently interpreted X-rays - see prior notes.  I have ordered and independently interpreted EKG Reading(s) - see prior notes  Clinical Tests:   Lab Tests: Reviewed       <> Summary of Lab: Minimally elevated D-dimer  ED Management:  Patient given IV fluid and Toradol.  Vital signs stable.  Patient informed of negative results as well as plan to discharge with prescription for albuterol, continue with home management, COVID home monitoring program ordered for him, instructed on reasons to return to the emergency room.                                 Clinical Impression:       ICD-10-CM ICD-9-CM   1. COVID-19  U07.1    2. Shortness of breath  R06.02 786.05         Disposition:   Disposition: Discharged  Condition: Stable                        Tonny Lewis MD  08/09/20 [id number]

## 2021-04-16 ENCOUNTER — PATIENT MESSAGE (OUTPATIENT)
Dept: RESEARCH | Facility: HOSPITAL | Age: 26
End: 2021-04-16

## 2021-07-17 ENCOUNTER — HOSPITAL ENCOUNTER (EMERGENCY)
Facility: HOSPITAL | Age: 26
Discharge: HOME | End: 2021-07-17
Attending: EMERGENCY MEDICINE
Payer: COMMERCIAL

## 2021-07-17 VITALS
SYSTOLIC BLOOD PRESSURE: 130 MMHG | TEMPERATURE: 98 F | DIASTOLIC BLOOD PRESSURE: 78 MMHG | RESPIRATION RATE: 16 BRPM | WEIGHT: 198 LBS | OXYGEN SATURATION: 97 % | HEART RATE: 93 BPM | HEIGHT: 69 IN | BODY MASS INDEX: 29.33 KG/M2

## 2021-07-17 DIAGNOSIS — J02.9 SORE THROAT: Primary | ICD-10-CM

## 2021-07-17 LAB — GROUP A STREP, MOLECULAR: NEGATIVE

## 2021-07-17 PROCEDURE — 87651 STREP A DNA AMP PROBE: CPT | Performed by: PHYSICIAN ASSISTANT

## 2021-07-17 PROCEDURE — 99283 EMERGENCY DEPT VISIT LOW MDM: CPT

## 2021-07-17 RX ORDER — LAMOTRIGINE 150 MG/1
800 TABLET ORAL
COMMUNITY

## 2021-07-17 RX ORDER — EMTRICITABINE AND TENOFOVIR DISOPROXIL FUMARATE 200; 300 MG/1; MG/1
1 TABLET, FILM COATED ORAL DAILY
COMMUNITY

## 2021-07-17 RX ORDER — SUMATRIPTAN 5 MG/1
SPRAY NASAL
COMMUNITY
Start: 2021-06-10

## 2021-07-17 RX ORDER — CETIRIZINE HYDROCHLORIDE 10 MG/1
10 TABLET ORAL DAILY
Qty: 30 TABLET | Refills: 0 | Status: SHIPPED | OUTPATIENT
Start: 2021-07-17 | End: 2023-10-15

## 2021-07-19 ENCOUNTER — HOSPITAL ENCOUNTER (EMERGENCY)
Facility: HOSPITAL | Age: 26
Discharge: HOME | End: 2021-07-19
Attending: EMERGENCY MEDICINE
Payer: COMMERCIAL

## 2021-07-19 VITALS
RESPIRATION RATE: 16 BRPM | TEMPERATURE: 98 F | SYSTOLIC BLOOD PRESSURE: 133 MMHG | OXYGEN SATURATION: 97 % | HEIGHT: 69 IN | HEART RATE: 97 BPM | BODY MASS INDEX: 29.33 KG/M2 | WEIGHT: 198 LBS | DIASTOLIC BLOOD PRESSURE: 84 MMHG

## 2021-07-19 DIAGNOSIS — B34.9 VIRAL SYNDROME: Primary | ICD-10-CM

## 2021-07-19 DIAGNOSIS — R07.89 CHEST TIGHTNESS: ICD-10-CM

## 2021-07-19 DIAGNOSIS — R05.9 COUGH: ICD-10-CM

## 2021-07-19 LAB
CTP QC/QA: YES
GROUP A STREP, MOLECULAR: NEGATIVE
SARS-COV-2 RDRP RESP QL NAA+PROBE: NEGATIVE

## 2021-07-19 PROCEDURE — 93010 ELECTROCARDIOGRAM REPORT: CPT | Mod: ,,, | Performed by: INTERNAL MEDICINE

## 2021-07-19 PROCEDURE — 93010 EKG 12-LEAD: ICD-10-PCS | Mod: ,,, | Performed by: INTERNAL MEDICINE

## 2021-07-19 PROCEDURE — 25000003 PHARM REV CODE 250: Performed by: PHYSICIAN ASSISTANT

## 2021-07-19 PROCEDURE — 93005 ELECTROCARDIOGRAM TRACING: CPT

## 2021-07-19 PROCEDURE — U0002 COVID-19 LAB TEST NON-CDC: HCPCS | Performed by: EMERGENCY MEDICINE

## 2021-07-19 PROCEDURE — 99284 EMERGENCY DEPT VISIT MOD MDM: CPT

## 2021-07-19 PROCEDURE — 87651 STREP A DNA AMP PROBE: CPT | Performed by: EMERGENCY MEDICINE

## 2021-07-19 RX ORDER — BENZONATATE 100 MG/1
100 CAPSULE ORAL 3 TIMES DAILY PRN
Qty: 20 CAPSULE | Refills: 0 | Status: SHIPPED | OUTPATIENT
Start: 2021-07-19 | End: 2021-07-29

## 2021-07-19 RX ORDER — ONDANSETRON 4 MG/1
4 TABLET, FILM COATED ORAL EVERY 12 HOURS PRN
Qty: 12 TABLET | Refills: 0 | Status: SHIPPED | OUTPATIENT
Start: 2021-07-19

## 2021-07-19 RX ORDER — ONDANSETRON 4 MG/1
4 TABLET, ORALLY DISINTEGRATING ORAL
Status: COMPLETED | OUTPATIENT
Start: 2021-07-19 | End: 2021-07-19

## 2021-07-19 RX ADMIN — ONDANSETRON 4 MG: 4 TABLET, ORALLY DISINTEGRATING ORAL at 11:07

## 2023-10-15 ENCOUNTER — OFFICE VISIT (OUTPATIENT)
Dept: URGENT CARE | Facility: CLINIC | Age: 28
End: 2023-10-15
Payer: COMMERCIAL

## 2023-10-15 VITALS
WEIGHT: 198 LBS | HEART RATE: 82 BPM | DIASTOLIC BLOOD PRESSURE: 80 MMHG | RESPIRATION RATE: 18 BRPM | OXYGEN SATURATION: 98 % | HEIGHT: 69 IN | SYSTOLIC BLOOD PRESSURE: 125 MMHG | BODY MASS INDEX: 29.33 KG/M2 | TEMPERATURE: 98 F

## 2023-10-15 DIAGNOSIS — B37.0 ORAL THRUSH: Primary | ICD-10-CM

## 2023-10-15 LAB
CTP QC/QA: YES
CTP QC/QA: YES
MOLECULAR STREP A: NEGATIVE
SARS-COV-2 AG RESP QL IA.RAPID: NEGATIVE

## 2023-10-15 PROCEDURE — 87651 POCT STREP A MOLECULAR: ICD-10-PCS | Mod: QW,S$GLB,, | Performed by: FAMILY MEDICINE

## 2023-10-15 PROCEDURE — 87811 SARS CORONAVIRUS 2 ANTIGEN POCT, MANUAL READ: ICD-10-PCS | Mod: QW,S$GLB,, | Performed by: FAMILY MEDICINE

## 2023-10-15 PROCEDURE — 87651 STREP A DNA AMP PROBE: CPT | Mod: QW,S$GLB,, | Performed by: FAMILY MEDICINE

## 2023-10-15 PROCEDURE — 99212 OFFICE O/P EST SF 10 MIN: CPT | Mod: S$GLB,,, | Performed by: FAMILY MEDICINE

## 2023-10-15 PROCEDURE — 87811 SARS-COV-2 COVID19 W/OPTIC: CPT | Mod: QW,S$GLB,, | Performed by: FAMILY MEDICINE

## 2023-10-15 PROCEDURE — 99212 PR OFFICE/OUTPT VISIT, EST, LEVL II, 10-19 MIN: ICD-10-PCS | Mod: S$GLB,,, | Performed by: FAMILY MEDICINE

## 2023-10-15 RX ORDER — NYSTATIN 100000 [USP'U]/ML
6 SUSPENSION ORAL 4 TIMES DAILY
Qty: 240 ML | Refills: 0 | Status: SHIPPED | OUTPATIENT
Start: 2023-10-15 | End: 2023-10-25

## 2023-10-15 NOTE — PROGRESS NOTES
"Subjective:      Patient ID: Flo Wayne is a 28 y.o. male.    Vitals:  height is 5' 9" (1.753 m) and weight is 89.8 kg (198 lb). His oral temperature is 97.9 °F (36.6 °C). His blood pressure is 125/80 and his pulse is 82. His respiration is 18 and oxygen saturation is 98%.     Chief Complaint: Sore Throat    This is a 28 y.o. male who presents today with a chief complaint of sore throat, abnormal bumps in mouth and abnormal taste when having meals. No complaints of fever, cough, congestion, shortness of breathe, patient states symptoms started this morning and he is not taking any medication over the counter.      Sore Throat   This is a new problem. The current episode started today. The problem has been unchanged. There has been no fever. The pain is at a severity of 7/10. The pain is mild. Associated symptoms include swollen glands and trouble swallowing. Pertinent negatives include no abdominal pain, congestion, coughing, diarrhea or headaches. He has had no exposure to strep or mono. He has tried nothing for the symptoms.       HENT:  Positive for sore throat and trouble swallowing. Negative for congestion.    Respiratory:  Negative for cough.    Gastrointestinal:  Negative for abdominal pain and diarrhea.   Neurological:  Negative for headaches.      Objective:     Physical Exam   Constitutional: He does not appear ill. No distress. normal  HENT:   Mouth/Throat: Oral lesions (thrush noted on tongue) present.   Cardiovascular: Normal rate, regular rhythm, normal heart sounds and normal pulses.   Pulmonary/Chest: Effort normal and breath sounds normal.   Abdominal: Normal appearance.   Neurological: He is alert.     Results for orders placed or performed in visit on 10/15/23   POCT Strep A, Molecular   Result Value Ref Range    Molecular Strep A, POC Negative Negative     Acceptable Yes    SARS Coronavirus 2 Antigen, POCT Manual Read   Result Value Ref Range    SARS Coronavirus 2 Antigen Negative " Negative     Acceptable Yes         Assessment:     1. Oral thrush        Plan:       Oral thrush  -     POCT Strep A, Molecular  -     SARS Coronavirus 2 Antigen, POCT Manual Read  -     nystatin (MYCOSTATIN) 100,000 unit/mL suspension; Take 6 mLs (600,000 Units total) by mouth 4 (four) times daily. Swish and spit for 10 days  Dispense: 240 mL; Refill: 0

## 2024-05-22 ENCOUNTER — HOSPITAL ENCOUNTER (EMERGENCY)
Facility: HOSPITAL | Age: 29
Discharge: HOME | End: 2024-05-22
Attending: STUDENT IN AN ORGANIZED HEALTH CARE EDUCATION/TRAINING PROGRAM
Payer: COMMERCIAL

## 2024-05-22 VITALS
HEART RATE: 67 BPM | RESPIRATION RATE: 17 BRPM | SYSTOLIC BLOOD PRESSURE: 134 MMHG | OXYGEN SATURATION: 98 % | TEMPERATURE: 98 F | DIASTOLIC BLOOD PRESSURE: 92 MMHG | BODY MASS INDEX: 30.36 KG/M2 | HEIGHT: 69 IN | WEIGHT: 205 LBS

## 2024-05-22 DIAGNOSIS — R07.89 CHEST WALL PAIN: ICD-10-CM

## 2024-05-22 PROCEDURE — 96372 THER/PROPH/DIAG INJ SC/IM: CPT | Performed by: STUDENT IN AN ORGANIZED HEALTH CARE EDUCATION/TRAINING PROGRAM

## 2024-05-22 PROCEDURE — 99284 EMERGENCY DEPT VISIT MOD MDM: CPT | Mod: 25

## 2024-05-22 PROCEDURE — 63600175 PHARM REV CODE 636 W HCPCS: Performed by: STUDENT IN AN ORGANIZED HEALTH CARE EDUCATION/TRAINING PROGRAM

## 2024-05-22 PROCEDURE — 25000003 PHARM REV CODE 250: Performed by: STUDENT IN AN ORGANIZED HEALTH CARE EDUCATION/TRAINING PROGRAM

## 2024-05-22 RX ORDER — KETOROLAC TROMETHAMINE 30 MG/ML
30 INJECTION, SOLUTION INTRAMUSCULAR; INTRAVENOUS
Status: COMPLETED | OUTPATIENT
Start: 2024-05-22 | End: 2024-05-22

## 2024-05-22 RX ORDER — OXYCODONE AND ACETAMINOPHEN 5; 325 MG/1; MG/1
1 TABLET ORAL
Status: COMPLETED | OUTPATIENT
Start: 2024-05-22 | End: 2024-05-22

## 2024-05-22 RX ORDER — KETOROLAC TROMETHAMINE 10 MG/1
10 TABLET, FILM COATED ORAL EVERY 8 HOURS
Qty: 15 TABLET | Refills: 0 | Status: SHIPPED | OUTPATIENT
Start: 2024-05-22 | End: 2024-05-27

## 2024-05-22 RX ADMIN — KETOROLAC TROMETHAMINE 30 MG: 30 INJECTION, SOLUTION INTRAMUSCULAR; INTRAVENOUS at 02:05

## 2024-05-22 RX ADMIN — OXYCODONE HYDROCHLORIDE AND ACETAMINOPHEN 1 TABLET: 5; 325 TABLET ORAL at 02:05

## 2024-05-22 NOTE — Clinical Note
"Flo Nix" Rosana was seen and treated in our emergency department on 5/21/2024.  He may return to work on 05/25/2024.       If you have any questions or concerns, please don't hesitate to call.      Leandro Quarles MD"

## 2024-05-23 NOTE — ED PROVIDER NOTES
"ED Provider Note - 5/21/2024    History     Chief Complaint   Patient presents with    COVID-19 Concerns     Tested positive for COVID yesterday. Started Paxlovid. Now having shortness of breath and nausea.   Advil 2 hours ago.       HPI     Flo Wayne is a 29 y.o. year old male with past medical and surgical history as seen below, presenting with chief complaint of chest wall pain.  Bilateral.  Worse when coughing.  Diagnosed with COVID yesterday.  Starting Paxlovid today.        Past Medical History:   Diagnosis Date    Dehydration     Hypokalemia 7/31/2019    Migraine 7/31/2019    Seizure disorder 7/31/2019    Viral gastroenteritis 7/31/2019     No past surgical history on file.      No family history on file.  Social History     Tobacco Use    Smoking status: Never     Passive exposure: Never    Smokeless tobacco: Never   Substance Use Topics    Alcohol use: Never    Drug use: Never     Social Determinants of Health with Concerns     Financial Resource Strain: Not on file   Food Insecurity: Not on file   Transportation Needs: Not on file   Physical Activity: Not on file   Stress: Not on file   Housing Stability: Not on file   Depression: Not on file   Utilities: Not on file   Health Literacy: Not on file   Social Isolation: Not on file      Review of patient's allergies indicates:  No Known Allergies    Review of Systems     A full Review of Systems (ROS) was performed and was negative unless otherwise stated in the HPI.      Physical Exam     Vitals:    05/22/24 0000 05/22/24 0123 05/22/24 0221 05/22/24 0310   BP: (!) 137/97 (!) 134/92     BP Location: Right arm      Patient Position: Sitting      Pulse: 76   67   Resp: 14  17    Temp: 97.9 °F (36.6 °C)      TempSrc: Oral      SpO2: 100%   98%   Weight: 93 kg (205 lb)      Height: 5' 9" (1.753 m)           Physical Exam    Nursing note and vitals reviewed.  Constitutional: He appears well-developed and well-nourished. No distress.   HENT:   Head: Normocephalic " "and atraumatic.   Right Ear: External ear normal.   Left Ear: External ear normal.   Nose: Nose normal.   Mouth/Throat: Oropharynx is clear and moist.   Eyes: Conjunctivae and EOM are normal. Pupils are equal, round, and reactive to light.   Neck: Neck supple.   Normal range of motion.  Cardiovascular:  Normal rate and regular rhythm.           No murmur heard.  Pulmonary/Chest: Breath sounds normal. No stridor. No respiratory distress. He has no wheezes. He has no rhonchi. He has no rales. He exhibits tenderness.   Abdominal: Abdomen is soft. Bowel sounds are normal. There is no abdominal tenderness.   Musculoskeletal:         General: No edema. Normal range of motion.      Cervical back: Normal range of motion and neck supple.     Neurological: He is alert and oriented to person, place, and time. He has normal strength. No cranial nerve deficit or sensory deficit.   Skin: Skin is warm and dry. No rash noted.   Psychiatric: He has a normal mood and affect. Thought content normal.         Lab Results- Independently reviewed by myself      Labs Reviewed - No data to display        Imaging     Imaging Results              X-Ray Chest PA And Lateral (Final result)  Result time 05/22/24 02:45:35      Final result by Cale Raines MD (05/22/24 02:45:35)                   Impression:      No acute cardiopulmonary finding.      Electronically signed by: Cale Raines MD  Date:    05/22/2024  Time:    02:45               Narrative:    EXAMINATION:  XR CHEST PA AND LATERAL    CLINICAL HISTORY:  Provided history is "  Other chest pain".    TECHNIQUE:  Frontal and lateral views of the chest were performed.    COMPARISON:  07/19/2021.    FINDINGS:  Cardiac silhouette is not enlarged. No focal consolidation.  No sizable pleural effusion.  No pneumothorax.                                    X-Rays:   Independently Interpreted Readings:   Chest X-Ray: No infiltrates.  No acute abnormalities.               ED Course     "     Procedures         Orders Placed This Encounter    X-Ray Chest PA And Lateral    oxyCODONE-acetaminophen 5-325 mg per tablet 1 tablet    ketorolac injection 30 mg    ketorolac (TORADOL) 10 mg tablet          ED Course as of 05/23/24 0249   Wed May 22, 2024   0256 X-Ray Chest PA And Lateral  Independent interpretation of chest x-ray:  No consolidations, pneumothorax, or other acute findings   [KB]      ED Course User Index  [KB] Leandro Quarles MD              Medical Decision Making       The patient's list of active medical problems, social history, medications, and allergies as documented per RN staff has been reviewed.           Medical Decision Making  This is a 29 y.o. male who appears to have a viral syndrome related to Covid-19.  This is cause chest wall pain that is reproducible with palpation most consistent with costochondritis.    Differential discussion:    Based upon the history and physical exam the patient does not appear to have a serious bacterial infection such as pneumonia, sepsis, otitis media, bacterial sinusitis, strep pharyngitis, parapharyngeal or peritonsillar abscess, or meningitis.  Also, his chest pain does not seem consistent with ACS, pneumothorax, PE, or aortic dissection.    Patient appears well and I have given specific return precautions to the patient and/or family members. Symptomatic/supportive measures discussed. The patient can take over the counter medications and does not appear to need antibiotics at this time.      Amount and/or Complexity of Data Reviewed  Radiology: ordered and independent interpretation performed. Decision-making details documented in ED Course.    Risk  Prescription drug management.                    ED Prescriptions       Medication Sig Dispense Start Date End Date Auth. Provider    ketorolac (TORADOL) 10 mg tablet Take 1 tablet (10 mg total) by mouth every 8 (eight) hours. for 5 days 15 tablet 5/22/2024 5/27/2024 Leandro Quarles MD               Clinical Impression       Follow-up Information       Follow up With Specialties Details Why Contact Info    Primary care provider of your choice  Schedule an appointment as soon as possible for a visit in 3 days For follow-up on today's visit.     [facility] Emergency Dept Emergency Medicine Go to  As needed, If symptoms worsen [address]  [facility] [id number] [phone]            Referrals:  No orders of the defined types were placed in this encounter.      Disposition   ED Disposition Condition    Discharge Stable            Diagnosis    ICD-10-CM ICD-9-CM   1. Chest wall pain  R07.89 786.52           Leandro Quarles MD        05/23/2024          DISCLAIMER: This note was prepared with [employer] voice recognition transcription software. Garbled syntax, mangled pronouns, and other bizarre constructions may be attributed to that software system.       Leandro Quarles MD  05/23/24 0251

## 2024-06-28 ENCOUNTER — HOSPITAL ENCOUNTER (EMERGENCY)
Facility: HOSPITAL | Age: 29
Discharge: HOME | End: 2024-06-28
Attending: EMERGENCY MEDICINE
Payer: COMMERCIAL

## 2024-06-28 ENCOUNTER — OFFICE VISIT (OUTPATIENT)
Dept: URGENT CARE | Facility: CLINIC | Age: 29
End: 2024-06-28
Payer: COMMERCIAL

## 2024-06-28 VITALS
HEART RATE: 102 BPM | RESPIRATION RATE: 18 BRPM | HEIGHT: 69 IN | WEIGHT: 205 LBS | SYSTOLIC BLOOD PRESSURE: 132 MMHG | DIASTOLIC BLOOD PRESSURE: 86 MMHG | BODY MASS INDEX: 30.36 KG/M2 | TEMPERATURE: 98 F | OXYGEN SATURATION: 97 %

## 2024-06-28 VITALS
HEART RATE: 85 BPM | BODY MASS INDEX: 30.36 KG/M2 | TEMPERATURE: 98 F | SYSTOLIC BLOOD PRESSURE: 117 MMHG | DIASTOLIC BLOOD PRESSURE: 80 MMHG | RESPIRATION RATE: 18 BRPM | OXYGEN SATURATION: 97 % | HEIGHT: 69 IN | WEIGHT: 205 LBS

## 2024-06-28 DIAGNOSIS — R10.84 GENERALIZED ABDOMINAL PAIN: ICD-10-CM

## 2024-06-28 DIAGNOSIS — R19.7 NAUSEA, VOMITING, AND DIARRHEA: ICD-10-CM

## 2024-06-28 DIAGNOSIS — R19.8 ABDOMINAL GUARDING: Primary | ICD-10-CM

## 2024-06-28 DIAGNOSIS — R00.0 TACHYCARDIA: ICD-10-CM

## 2024-06-28 DIAGNOSIS — K52.9 ENTERITIS: Primary | ICD-10-CM

## 2024-06-28 DIAGNOSIS — E86.0 MILD DEHYDRATION: ICD-10-CM

## 2024-06-28 DIAGNOSIS — R11.2 NAUSEA, VOMITING, AND DIARRHEA: ICD-10-CM

## 2024-06-28 DIAGNOSIS — R10.9 RIGHT FLANK PAIN: ICD-10-CM

## 2024-06-28 LAB
ALBUMIN SERPL BCP-MCNC: 3.8 G/DL (ref 3.5–5.2)
ALP SERPL-CCNC: 92 U/L (ref 55–135)
ALT SERPL W/O P-5'-P-CCNC: 77 U/L (ref 10–44)
ANION GAP SERPL CALC-SCNC: 7 MMOL/L (ref 8–16)
AST SERPL-CCNC: 27 U/L (ref 10–40)
BASOPHILS # BLD AUTO: 0.04 K/UL (ref 0–0.2)
BASOPHILS NFR BLD: 0.3 % (ref 0–1.9)
BILIRUB SERPL-MCNC: 1.1 MG/DL (ref 0.1–1)
BILIRUB UR QL STRIP: NEGATIVE
BUN SERPL-MCNC: 10 MG/DL (ref 6–20)
BUN SERPL-MCNC: 10 MG/DL (ref 6–30)
CALCIUM SERPL-MCNC: 9.2 MG/DL (ref 8.7–10.5)
CHLORIDE SERPL-SCNC: 103 MMOL/L (ref 95–110)
CHLORIDE SERPL-SCNC: 106 MMOL/L (ref 95–110)
CLARITY UR REFRACT.AUTO: CLEAR
CO2 SERPL-SCNC: 26 MMOL/L (ref 23–29)
COLOR UR AUTO: YELLOW
CREAT SERPL-MCNC: 1.1 MG/DL (ref 0.5–1.4)
CREAT SERPL-MCNC: 1.1 MG/DL (ref 0.5–1.4)
DIFFERENTIAL METHOD BLD: ABNORMAL
EOSINOPHIL # BLD AUTO: 0.2 K/UL (ref 0–0.5)
EOSINOPHIL NFR BLD: 1.1 % (ref 0–8)
ERYTHROCYTE [DISTWIDTH] IN BLOOD BY AUTOMATED COUNT: 13.2 % (ref 11.5–14.5)
EST. GFR  (NO RACE VARIABLE): >60 ML/MIN/1.73 M^2
GLUCOSE SERPL-MCNC: 118 MG/DL (ref 70–110)
GLUCOSE SERPL-MCNC: 121 MG/DL (ref 70–110)
GLUCOSE UR QL STRIP: NEGATIVE
HCT VFR BLD AUTO: 45.1 % (ref 40–54)
HCT VFR BLD CALC: 47 %PCV (ref 36–54)
HCV AB SERPL QL IA: NORMAL
HGB BLD-MCNC: 15.8 G/DL (ref 14–18)
HGB UR QL STRIP: NEGATIVE
HIV 1+2 AB+HIV1 P24 AG SERPL QL IA: NORMAL
IMM GRANULOCYTES # BLD AUTO: 0.05 K/UL (ref 0–0.04)
IMM GRANULOCYTES NFR BLD AUTO: 0.3 % (ref 0–0.5)
KETONES UR QL STRIP: NEGATIVE
LEUKOCYTE ESTERASE UR QL STRIP: NEGATIVE
LIPASE SERPL-CCNC: 17 U/L (ref 4–60)
LYMPHOCYTES # BLD AUTO: 1.8 K/UL (ref 1–4.8)
LYMPHOCYTES NFR BLD: 11.7 % (ref 18–48)
MCH RBC QN AUTO: 30.5 PG (ref 27–31)
MCHC RBC AUTO-ENTMCNC: 35 G/DL (ref 32–36)
MCV RBC AUTO: 87 FL (ref 82–98)
MONOCYTES # BLD AUTO: 1 K/UL (ref 0.3–1)
MONOCYTES NFR BLD: 6.7 % (ref 4–15)
NEUTROPHILS # BLD AUTO: 12.3 K/UL (ref 1.8–7.7)
NEUTROPHILS NFR BLD: 79.9 % (ref 38–73)
NITRITE UR QL STRIP: NEGATIVE
NRBC BLD-RTO: 0 /100 WBC
PH UR STRIP: 6 [PH] (ref 5–8)
PLATELET # BLD AUTO: 272 K/UL (ref 150–450)
PMV BLD AUTO: 8.9 FL (ref 9.2–12.9)
POC IONIZED CALCIUM: 1.21 MMOL/L (ref 1.06–1.42)
POC TCO2 (MEASURED): 23 MMOL/L (ref 23–29)
POTASSIUM BLD-SCNC: 3.8 MMOL/L (ref 3.5–5.1)
POTASSIUM SERPL-SCNC: 4.1 MMOL/L (ref 3.5–5.1)
PROT SERPL-MCNC: 7 G/DL (ref 6–8.4)
PROT UR QL STRIP: NEGATIVE
RBC # BLD AUTO: 5.18 M/UL (ref 4.6–6.2)
SAMPLE: ABNORMAL
SODIUM BLD-SCNC: 142 MMOL/L (ref 136–145)
SODIUM SERPL-SCNC: 139 MMOL/L (ref 136–145)
SP GR UR STRIP: 1.02 (ref 1–1.03)
URN SPEC COLLECT METH UR: NORMAL
WBC # BLD AUTO: 15.34 K/UL (ref 3.9–12.7)

## 2024-06-28 PROCEDURE — 87389 HIV-1 AG W/HIV-1&-2 AB AG IA: CPT | Performed by: PHYSICIAN ASSISTANT

## 2024-06-28 PROCEDURE — 96375 TX/PRO/DX INJ NEW DRUG ADDON: CPT

## 2024-06-28 PROCEDURE — 86803 HEPATITIS C AB TEST: CPT | Performed by: PHYSICIAN ASSISTANT

## 2024-06-28 PROCEDURE — 25000003 PHARM REV CODE 250: Performed by: EMERGENCY MEDICINE

## 2024-06-28 PROCEDURE — 96374 THER/PROPH/DIAG INJ IV PUSH: CPT

## 2024-06-28 PROCEDURE — 96361 HYDRATE IV INFUSION ADD-ON: CPT

## 2024-06-28 PROCEDURE — 85025 COMPLETE CBC W/AUTO DIFF WBC: CPT | Performed by: EMERGENCY MEDICINE

## 2024-06-28 PROCEDURE — 25500020 PHARM REV CODE 255: Performed by: EMERGENCY MEDICINE

## 2024-06-28 PROCEDURE — 63600175 PHARM REV CODE 636 W HCPCS: Performed by: EMERGENCY MEDICINE

## 2024-06-28 PROCEDURE — 99284 EMERGENCY DEPT VISIT MOD MDM: CPT | Mod: 25

## 2024-06-28 PROCEDURE — 80053 COMPREHEN METABOLIC PANEL: CPT | Performed by: EMERGENCY MEDICINE

## 2024-06-28 PROCEDURE — 81003 URINALYSIS AUTO W/O SCOPE: CPT | Performed by: EMERGENCY MEDICINE

## 2024-06-28 PROCEDURE — 83690 ASSAY OF LIPASE: CPT | Performed by: EMERGENCY MEDICINE

## 2024-06-28 RX ORDER — KETOROLAC TROMETHAMINE 30 MG/ML
10 INJECTION, SOLUTION INTRAMUSCULAR; INTRAVENOUS
Status: COMPLETED | OUTPATIENT
Start: 2024-06-28 | End: 2024-06-28

## 2024-06-28 RX ORDER — DICYCLOMINE HYDROCHLORIDE 10 MG/1
10 CAPSULE ORAL
Qty: 28 CAPSULE | Refills: 0 | Status: SHIPPED | OUTPATIENT
Start: 2024-06-28 | End: 2024-07-05

## 2024-06-28 RX ORDER — ONDANSETRON HYDROCHLORIDE 2 MG/ML
4 INJECTION, SOLUTION INTRAVENOUS
Status: COMPLETED | OUTPATIENT
Start: 2024-06-28 | End: 2024-06-28

## 2024-06-28 RX ORDER — ONDANSETRON 8 MG/1
8 TABLET, ORALLY DISINTEGRATING ORAL
Status: COMPLETED | OUTPATIENT
Start: 2024-06-28 | End: 2024-06-28

## 2024-06-28 RX ADMIN — IOHEXOL 75 ML: 350 INJECTION, SOLUTION INTRAVENOUS at 12:06

## 2024-06-28 RX ADMIN — ONDANSETRON 8 MG: 8 TABLET, ORALLY DISINTEGRATING ORAL at 10:06

## 2024-06-28 RX ADMIN — ONDANSETRON 4 MG: 2 INJECTION INTRAMUSCULAR; INTRAVENOUS at 12:06

## 2024-06-28 RX ADMIN — SODIUM CHLORIDE 1000 ML: 9 INJECTION, SOLUTION INTRAVENOUS at 12:06

## 2024-06-28 RX ADMIN — KETOROLAC TROMETHAMINE 10 MG: 30 INJECTION, SOLUTION INTRAMUSCULAR; INTRAVENOUS at 12:06

## 2024-06-28 NOTE — ED PROVIDER NOTES
Called patient back to provide information about the recommended plan that Dr. Cruz has for her. Pt stated that she did want surgery when she saw Dr. Cruz at the [facility], but has now changed her mind and would like to know if Dr. Cruz can recommend other options for her instead of getting the cyst removed.     Pt was told that I would review with Dr. Cruz and would get back with her as soon as possible.     Pt was thankful with call.    Encounter Date: 6/28/2024       History     Chief Complaint   Patient presents with    Abdominal Pain     RLQ abdominal pain emesis and diarrhea onset last night. Referred from Urgent care     29-year-old male with a past medical history of seizure presents with complaint of right lower quadrant/right flank pain.  Symptoms began yesterday with nausea and diarrhea.  He subsequently developed bilateral lower quadrant pain that is worse on the right and radiates to his right flank.  It was worsened by eating.  He denies any alleviating factors.  He was not had fever or chills.  He has not had dysuria or increased urinary frequency.  Denies a past history of abdominal surgery      Review of patient's allergies indicates:  No Known Allergies  Past Medical History:   Diagnosis Date    Dehydration     Hypokalemia 7/31/2019    Migraine 7/31/2019    Seizure disorder 7/31/2019    Viral gastroenteritis 7/31/2019     No past surgical history on file.  No family history on file.  Social History     Tobacco Use    Smoking status: Never     Passive exposure: Never    Smokeless tobacco: Never   Substance Use Topics    Alcohol use: Never    Drug use: Never     Review of Systems  All other systems reviewed and negative except as noted in HPI    Physical Exam     Initial Vitals [06/28/24 1131]   BP Pulse Resp Temp SpO2   (!) 154/93 106 20 98.3 °F (36.8 °C) 99 %      MAP       --         Physical Exam  General: AO x 3, NAD. Well nourished. Well Developed  Head: Normocephalic and Atraumatic  HEENT: PERRLA. EOMI. OP Clear  Neck: Supple, Nontender in midline.  Cardiovascular: RRR. No m/r/g. 2+ Distal Pulses  Pulm/Chest: Chest nontender. Lungs clear to auscultation. No respiratory distress.  Abdomen:  Tenderness to palpation at McBurney's point.  Involuntary guarding.  No rigidity or rebound.  MSK: extremities atraumatic x 4. Gait normal  Ext: no clubbing, cyanosis, or edema  Neuro: GCS 15. CN II-XII intact. Intact symmetric sensation,  strength, DTR x 4 extremities  Skin: no bullae, petechiae, or purpura. Warm, dry, and intact.  Psych: normal mood and affect.    ED Course   Procedures  Labs Reviewed   CBC W/ AUTO DIFFERENTIAL - Abnormal; Notable for the following components:       Result Value    WBC 15.34 (*)     MPV 8.9 (*)     Gran # (ANC) 12.3 (*)     Immature Grans (Abs) 0.05 (*)     Gran % 79.9 (*)     Lymph % 11.7 (*)     All other components within normal limits    Narrative:     Release to patient->Immediate   COMPREHENSIVE METABOLIC PANEL - Abnormal; Notable for the following components:    Glucose 118 (*)     Total Bilirubin 1.1 (*)     ALT 77 (*)     Anion Gap 7 (*)     All other components within normal limits    Narrative:     Release to patient->Immediate   ISTAT PROCEDURE - Abnormal; Notable for the following components:    POC Glucose 121 (*)     All other components within normal limits   LIPASE    Narrative:     Release to patient->Immediate   URINALYSIS, REFLEX TO URINE CULTURE    Narrative:     Specimen Source->Urine   HIV 1 / 2 ANTIBODY    Narrative:     Release to patient->Immediate   HEPATITIS C ANTIBODY    Narrative:     Release to patient->Immediate          Imaging Results              CT Abdomen Pelvis With IV Contrast NO Oral Contrast (Final result)  Result time 06/28/24 13:04:30      Final result by Tristen Dahl MD (06/28/24 13:04:30)                   Impression:      Nonspecific mucosal enhancement and scattered submucosal edema about the visualized small bowel, with mild surrounding mesenteric stranding and engorgement of the vasa recta.  Findings likely relate to evolving infectious or non infectious inflammatory enteritis.  Correlation is advised.    Appendix is visualized without evidence for appendicitis.    Hepatosplenomegaly with hepatic steatosis.  No focal hepatic lesions.    Few prominent mesenteric and retroperitoneal lymph nodes, likely reactive.      Electronically signed by: Tristen  [facility]  Date:    06/28/2024  Time:    13:04               Narrative:    EXAMINATION:  CT ABDOMEN PELVIS WITH IV CONTRAST    CLINICAL HISTORY:  RLQ abdominal pain (Age >= 14y);    TECHNIQUE:  Low-dose, axial CT images of the abdomen and pelvis were obtained after the administration of 75 cc of Omnipaque 350 intravenous contrast material. No oral contrast was administered.  Coronal and sagittal reformations were provided for review.    COMPARISON:  Abdominal radiograph 10/21/2022, CT abdomen pelvis without contrast 12/01/2019    FINDINGS:  The heart is normal size with no pericardial effusion.    Lung bases are clear.    Liver is enlarged in size measuring 19.2 cm, demonstrating diffusely decreased attenuation, which can be seen in the setting of hepatic steatosis.  No focal hepatic lesions.    Gallbladder is contracted limiting evaluation.  No intrahepatic or extrahepatic ductal dilatation.    Spleen is enlarged in size measuring 12.2 cm.    Adrenal glands and pancreas are unremarkable.    Kidneys are normal in size and location, concentrating contrast appropriately.  No solid renal lesion or hydronephrosis.    Ureters are normal in course and caliber.  Urinary bladder is unremarkable.    Prostate is unremarkable.    Stomach and duodenum are unremarkable.  Nonspecific mucosal enhancement about the visualized small bowel (for example axial series 2, image 110) additional mild submucosal edema about scattered loops of small bowel.  Findings extend to the level of the terminal ileum (series 2, image 129).  Mild surrounding mesenteric stranding and engorgement of the vasa recta. Few, scattered prominent mesenteric and retroperitoneal lymph nodes, likely reactive.  The appendix is visualized without evidence for appendicitis (series 2, image 132).  Scattered colonic diverticula without evidence for acute diverticulitis.    No abdominopelvic ascites.  No free intra-abdominal air.    Aorta is unremarkable.    Small fat  containing umbilical hernia.  Small bilateral fat containing inguinal hernias.    Osseous structures demonstrate no evidence for acute fracture or osseous destructive lesion.                                       Medications   ketorolac injection 9.999 mg (9.999 mg Intravenous Given 6/28/24 1228)   ondansetron injection 4 mg (4 mg Intravenous Given 6/28/24 1227)   sodium chloride 0.9% bolus 1,000 mL 1,000 mL (0 mLs Intravenous Stopped 6/28/24 1326)   iohexoL (OMNIPAQUE 350) injection 75 mL (75 mLs Intravenous Given 6/28/24 1245)     Medical Decision Making  Greatest concern is for possible appendicitis given tenderness to palpation in the right lower quadrant with involuntary guarding.  Presentation can also be consistent with right UVJ stone.  Will obtain CT of abdomen.  Will obtain CBC and chemistry.  Pain control provided.    Amount and/or Complexity of Data Reviewed  External Data Reviewed: labs, radiology, ECG and notes.     Details: History of seizures on lamotrigine  Labs: ordered. Decision-making details documented in ED Course.  Radiology: ordered and independent interpretation performed. Decision-making details documented in ED Course.    Risk  OTC drugs.  Prescription drug management.  Decision regarding hospitalization.  Elective major surgery with identified risk factors.               ED Course as of 07/01/24 0710   Fri Jun 28, 2024   1251 CBC W/ AUTO DIFFERENTIAL(!) [DS]   1316 Urinalysis, Reflex to Urine Culture Urine, Clean Catch  Imaging of abdomen shows enteritis without appendicitis.  Will treat symptomatically.  Will treat with dicyclomine.  Strict return precautions and anticipatory guidance given. [DS]      ED Course User Index  [DS] Sessions, Hoang LOYOLA MD                           Clinical Impression:  Final diagnoses:  [K52.9] Enteritis (Primary)          ED Disposition Condition    Discharge Stable          ED Prescriptions       Medication Sig Dispense Start Date End Date Auth. Provider     dicyclomine (BENTYL) 10 MG capsule Take 1 capsule (10 mg total) by mouth 4 (four) times daily before meals and nightly. for 7 days 28 capsule 6/28/2024 7/5/2024 Sessions, Hoang LOYOLA MD          Follow-up Information    None          Sessions, Hoang LOYOLA MD  07/01/24 5239

## 2024-06-28 NOTE — ED NOTES
I-STAT Chem-8+ Results:   Value Reference Range   Sodium 142 136-145 mmol/L   Potassium  3.8 3.5-5.1 mmol/L   Chloride 103  mmol/L   Ionized Calcium 1.21 1.06-1.42 mmol/L   CO2 (measured) 23 23-29 mmol/L   Glucose 121  mg/dL   BUN 10 6-30 mg/dL   Creatinine 1.1 0.5-1.4 mg/dL   Hematocrit 47 36-54%

## 2024-06-28 NOTE — FIRST PROVIDER EVALUATION
Medical screening examination initiated.  I have conducted a focused provider triage encounter, findings are as follows:    Brief history of present illness:  Right lower abdominal pain with back pain and vomiting    There were no vitals filed for this visit.    Pertinent physical exam:  uncomfortable    Brief workup plan:  labs, urine, ct, iv fluids, toradol, zofran    Preliminary workup initiated; this workup will be continued and followed by the physician or advanced practice provider that is assigned to the patient when roomed.

## 2024-06-28 NOTE — ED TRIAGE NOTES
Pt arrives to ED with n/v and RLQ abdominal pain radiating from right to left. Pt endorses right flank pain. Pt states having difficulty eating drinking since this started yesterday evening. Pt denies CP or SOB. Denies dysuria or increased frequency. Reports to diarrhea.

## 2024-06-28 NOTE — DISCHARGE INSTRUCTIONS
Return to the emergency department if any new or concerning symptoms occur    Take over the counter acetaminophen 1000mg or ibuprofen 600mg every 6 hours as needed for pain and inflammation

## 2024-06-28 NOTE — PROGRESS NOTES
"Subjective:      Patient ID: Flo Wayne is a 29 y.o. male.    Vitals:  height is 5' 9" (1.753 m) and weight is 93 kg (205 lb). His oral temperature is 98.3 °F (36.8 °C). His blood pressure is 132/86 and his pulse is 102. His respiration is 18 and oxygen saturation is 97%.     Chief Complaint: Nausea    This is a 29 y.o. male with h/o seizure d/o who presents to  today with a chief complaint of abdominal pain and back pain.  PT c/o   nausea, vomiting, and diarrhea that began yesterday-along with the pain. Denies known fever and denies blood in stool. Pt states he can not think of anything he ate that may have caused these symptoms. He states he is having both right and left abdominal pain, and that the pain radiates to both sides of lower back/flank area--but more so on the right. Symptoms have rapidly worsened-he states. He has not taken any medication for symptoms, at home. Reports decreased appetite, and unable to have eaten or drank much since yesterday.     Nausea  This is a new problem. The current episode started yesterday. The problem has been rapidly worsening. Associated symptoms include abdominal pain, a change in bowel habit and nausea. Pertinent negatives include no fever or vomiting. Associated symptoms comments: diarrhea. The symptoms are aggravated by swallowing and eating. He has tried nothing for the symptoms. The treatment provided no relief.       Constitution: Negative for fever.   Gastrointestinal:  Positive for abdominal pain, abdominal bloating, nausea and diarrhea. Negative for abdominal trauma, history of abdominal surgery, vomiting, bright red blood in stool, dark colored stools and heartburn.   Genitourinary:  Positive for flank pain. Negative for dysuria.      Objective:     Physical Exam   Constitutional: He is oriented to person, place, and time.  Non-toxic appearance. He appears ill. No distress.   HENT:   Head: Normocephalic.   Nose: Nose normal.   Mouth/Throat: No oropharyngeal " exudate or posterior oropharyngeal erythema. Oropharynx is clear.      Comments: Sticky MM  Neck: Neck supple. No neck rigidity present.   Cardiovascular: Tachycardia present.   Pulmonary/Chest: Effort normal and breath sounds normal. No respiratory distress. He has no wheezes.   Abdominal: Normal appearance. He exhibits distension (mildly bloated). Soft. Bowel sounds are increased. flat abdomen There is abdominal tenderness in the right upper quadrant, right lower quadrant, left upper quadrant and left lower quadrant. There is guarding (to right upper quadrant) and right CVA tenderness. There is no rebound and no left CVA tenderness.   Musculoskeletal: Normal range of motion.         General: Normal range of motion.   Neurological: He is alert and oriented to person, place, and time.   Skin: Skin is warm, dry and not diaphoretic.   Nursing note and vitals reviewed.      Assessment:     1. Abdominal guarding    2. Right flank pain    3. Generalized abdominal pain    4. Nausea, vomiting, and diarrhea    5. Tachycardia    6. Mild dehydration        Plan:   Due to the high risk of complications, the pt is being sent to the ER for further evaluation, treatment, and possible hospitalization. I have reviewed the pt's previous labs, visits, and images to look for any trends or previous treatment.      Abdominal guarding  -     Refer to Emergency Dept.    Right flank pain  -     Refer to Emergency Dept.    Generalized abdominal pain  -     Refer to Emergency Dept.    Nausea, vomiting, and diarrhea  -     Refer to Emergency Dept.    Tachycardia    Mild dehydration    Other orders  -     ondansetron disintegrating tablet 8 mg      Patient Instructions   Please go to ER now for a further abdominal work-up  Do not eat or drink at this time

## 2024-07-28 ENCOUNTER — HOSPITAL ENCOUNTER (EMERGENCY)
Facility: HOSPITAL | Age: 29
Discharge: HOME | End: 2024-07-28
Attending: EMERGENCY MEDICINE
Payer: COMMERCIAL

## 2024-07-28 VITALS
HEIGHT: 69 IN | WEIGHT: 205 LBS | RESPIRATION RATE: 18 BRPM | TEMPERATURE: 99 F | HEART RATE: 107 BPM | OXYGEN SATURATION: 97 % | DIASTOLIC BLOOD PRESSURE: 94 MMHG | SYSTOLIC BLOOD PRESSURE: 138 MMHG | BODY MASS INDEX: 30.36 KG/M2

## 2024-07-28 DIAGNOSIS — J02.0 STREP PHARYNGITIS: Primary | ICD-10-CM

## 2024-07-28 LAB
GROUP A STREP, MOLECULAR: POSITIVE
INFLUENZA A, MOLECULAR: NEGATIVE
INFLUENZA B, MOLECULAR: NEGATIVE
SARS-COV-2 RDRP RESP QL NAA+PROBE: NEGATIVE
SPECIMEN SOURCE: NORMAL

## 2024-07-28 PROCEDURE — 99284 EMERGENCY DEPT VISIT MOD MDM: CPT

## 2024-07-28 PROCEDURE — 87651 STREP A DNA AMP PROBE: CPT

## 2024-07-28 PROCEDURE — U0002 COVID-19 LAB TEST NON-CDC: HCPCS

## 2024-07-28 PROCEDURE — 87502 INFLUENZA DNA AMP PROBE: CPT

## 2024-07-28 RX ORDER — PREDNISONE 20 MG/1
40 TABLET ORAL DAILY
Qty: 6 TABLET | Refills: 0 | Status: SHIPPED | OUTPATIENT
Start: 2024-07-28 | End: 2024-07-28

## 2024-07-28 RX ORDER — CEPHALEXIN 500 MG/1
500 CAPSULE ORAL EVERY 12 HOURS
Qty: 20 CAPSULE | Refills: 0 | Status: SHIPPED | OUTPATIENT
Start: 2024-07-28 | End: 2024-08-07

## 2024-07-28 RX ORDER — PREDNISONE 20 MG/1
20 TABLET ORAL DAILY
Qty: 3 TABLET | Refills: 0 | Status: SHIPPED | OUTPATIENT
Start: 2024-07-28 | End: 2024-07-31

## 2024-07-28 NOTE — Clinical Note
"Flo Nix" Rosana was seen and treated in our emergency department on 7/28/2024.  He may return to work on 07/30/2024.       If you have any questions or concerns, please don't hesitate to call.      Janette Maya PA-C"

## 2024-07-28 NOTE — Clinical Note
"Flo Nix" Rosana was seen and treated in our emergency department on 7/28/2024.  He may return to work on 07/29/2024.       If you have any questions or concerns, please don't hesitate to call.      Janette Maya PA-C"

## 2024-07-28 NOTE — ED PROVIDER NOTES
Encounter Date: 7/28/2024       History     Chief Complaint   Patient presents with    Sore Throat     Generalized weakness, decreased PO intake, sore throat, nausea x 48 hours. Has been taking tylenol and cold and flu for symptoms with little relief. Speech clear.      Patient is a 29 y.o. male who presents with sore throat, generalized malaise, nausea, decreased oral intake X 2 days. Patient does not reports close contacts with similar symptoms. Patient says that he had strep throat a few months ago. Patient has taken OTC cold/flu medication for symptom relief.  Denies fever, headache, chest pain, shortness of breath, wheezing, stridor, drooling, vomiting, diarrhea, abdominal pain, constipation, urinary problems, joint problems, rashes, or any other complaints at this time.      The history is provided by the patient.     Review of patient's allergies indicates:  No Known Allergies  Past Medical History:   Diagnosis Date    Dehydration     Hypokalemia 7/31/2019    Migraine 7/31/2019    Seizure disorder 7/31/2019    Viral gastroenteritis 7/31/2019     No past surgical history on file.  No family history on file.  Social History     Tobacco Use    Smoking status: Never     Passive exposure: Never    Smokeless tobacco: Never   Substance Use Topics    Alcohol use: Never    Drug use: Never     Review of Systems   Constitutional:  Positive for appetite change. Negative for chills and fever.   HENT:  Positive for sore throat. Negative for congestion, ear discharge, ear pain, facial swelling, postnasal drip, rhinorrhea, trouble swallowing and voice change.    Respiratory:  Negative for cough, shortness of breath and wheezing.    Cardiovascular:  Negative for chest pain.   Gastrointestinal:  Positive for nausea. Negative for abdominal distention, abdominal pain, diarrhea and vomiting.   Genitourinary:  Negative for dysuria, flank pain, frequency and hematuria.   Musculoskeletal:  Negative for back pain, neck pain and neck  stiffness.   Skin:  Negative for rash.   Neurological:  Negative for weakness.   Hematological:  Does not bruise/bleed easily.   All other systems reviewed and are negative.      Physical Exam     Initial Vitals [07/28/24 1412]   BP Pulse Resp Temp SpO2   (!) 138/94 107 18 99.2 °F (37.3 °C) 97 %      MAP       --         Physical Exam    Vitals reviewed.  Constitutional: He appears well-developed and well-nourished.   HENT:   Head: Normocephalic and atraumatic.   Nose: Rhinorrhea present.   Mouth/Throat: Uvula is midline. Oropharyngeal exudate, posterior oropharyngeal edema and posterior oropharyngeal erythema present. No tonsillar abscesses.   2+ tonsils, bilaterally.  Tonsils symmetrical. Tonsils are erythematous.  There is tonsillar exudate.  No apparent PTA. Uvula midline.  There is not cervical adenopathy. No Que's angina.     Eyes: EOM are normal. Pupils are equal, round, and reactive to light.   Neck:   Normal range of motion.  Cardiovascular:  Normal rate, regular rhythm and normal heart sounds.           Pulmonary/Chest: Breath sounds normal. No respiratory distress. He has no wheezes. He has no rhonchi. He has no rales.   Abdominal: Bowel sounds are normal. He exhibits no distension. There is no abdominal tenderness. There is no rebound and no guarding.   Musculoskeletal:      Cervical back: Normal range of motion.     Neurological: He is alert and oriented to person, place, and time.         ED Course   Procedures  Labs Reviewed   GROUP A STREP, MOLECULAR - Abnormal       Result Value    Group A Strep, Molecular Positive (*)    INFLUENZA A & B BY MOLECULAR    Influenza A, Molecular Negative      Influenza B, Molecular Negative      Flu A & B Source NP     SARS-COV-2 RNA AMPLIFICATION, QUAL    SARS-CoV-2 RNA, Amplification, Qual Negative            Imaging Results    None          Medications - No data to display  Medical Decision Making  Patient is well-appearing, afebrile 29 y.o. male. VSS and do not  suggest sepsis. Denies chest pain, SOB, wheezing, difficulty swallowing, neck pain, neck stiffness. Lung sounds are clear and not consistent with pneumonia. There is no neck pain or limited ROM to suggest retropharyngeal abscess or meningitis. Tolerating PO, non-toxic appearing, no hypoxia. The patient remained comfortable and stable during their visit in the ED.  Details of ED course documented in ED workup.     Differential Diagnosis includes, but is not limited to: COVID, influenza, viral URI, bacterial/viral pharyngitis, pneumonia, PTA, sinusitis    All historical, clinical, and laboratory findings reviewed. COVID test negative. Influenza test negative. Strep test positive.Patient with constellation of symptoms most consistent with a strep pharyngitis, sent in rx for keflex because patient was recently on amoxicillin. There are no concerning features on physical exam to suggest an emergent or life threatening condition or an invasive bacterial infection, including, but not limited to: bacterial otitis media/externa, sinusitis, pharyngitis, or peritonsillar abscess. No further intervention is indicated at this time. The patient is at low risk for an emergent/life threatening medical condition at this time, and I am of the belief that that it is safe to discharge the patient from the emergency department.     Patient instructed to follow up with PCP in 2-3 days for recheck of today's complaints. Patient has been counseled regarding the need for follow-up as well as the indications to return to the emergency room should new or worrisome developments. Discharge and follow-up instructions discussed with the patient who expressed understanding and willingness to comply with recommendations. Patient discharged from the emergency department in stable condition, in no acute distress.     Amount and/or Complexity of Data Reviewed  Labs:  Decision-making details documented in ED Course.    Risk  Prescription drug  management.               ED Course as of 07/28/24 1807   Sun Jul 28, 2024   1453 Group A Strep, Molecular(!): Positive [OB]   1456 SARS-CoV-2 RNA, Amplification, Qual: Negative [OB]      ED Course User Index  [OB] Janette Maya PA-C                           Clinical Impression:  Final diagnoses:  [J02.0] Strep pharyngitis (Primary)          ED Disposition Condition    Discharge Stable          ED Prescriptions       Medication Sig Dispense Start Date End Date Auth. Provider    cephALEXin (KEFLEX) 500 MG capsule Take 1 capsule (500 mg total) by mouth every 12 (twelve) hours. for 10 days 20 capsule 7/28/2024 8/7/2024 Janette Maya PA-C    predniSONE (DELTASONE) 20 MG tablet  (Status: Discontinued) Take 2 tablets (40 mg total) by mouth once daily. for 3 days 6 tablet 7/28/2024 7/28/2024 Janette Maya PA-C    predniSONE (DELTASONE) 20 MG tablet Take 1 tablet (20 mg total) by mouth once daily. for 3 days 3 tablet 7/28/2024 7/31/2024 Janette Maya PA-C          Follow-up Information    None          Janette Maya PA-C  07/28/24 1808

## 2024-07-28 NOTE — DISCHARGE INSTRUCTIONS
Thank you for letting me care for you today - it was nice to meet you and I hope you feel better soon. Please follow up Ear Nose and Throat for follow up care. [facility] will call you within 48 hours to make an appointment, or you can call [phone] ([phone]) to schedule.     I sent in the following medication:   Keflex: 2 times per day for 10 days  Prednisone: 1 tablet once a day for 3 days    After taking the antibiotic for 24-36 hours, I recommend getting a new toothbrush to prevent reinfection.     Our goal at [facility] is to always give you outstanding care and exceptional service. You may receive a survey by mail or email in the next week about your experience in our ED. We would greatly appreciate you completing and returning the survey. Your feedback provides us with a way to recognize our staff who give very good care and it helps us learn how to improve when your experience was below our aspiration of excellence.     All the best,     Janette Maya, MPH, PA-C  Emergency Department Physician Assistant  [facility], [facility]

## 2024-09-10 NOTE — ED NOTES
Detail Level: Detailed Patient to CT   Add 32969 Cpt? (Important Note: In 2017 The Use Of 11283 Is Being Tracked By Cms To Determine Future Global Period Reimbursement For Global Periods): no

## 2024-09-29 ENCOUNTER — HOSPITAL ENCOUNTER (EMERGENCY)
Facility: HOSPITAL | Age: 29
Discharge: HOME | End: 2024-09-29
Attending: EMERGENCY MEDICINE
Payer: COMMERCIAL

## 2024-09-29 VITALS
DIASTOLIC BLOOD PRESSURE: 81 MMHG | OXYGEN SATURATION: 97 % | TEMPERATURE: 99 F | HEIGHT: 69 IN | RESPIRATION RATE: 17 BRPM | HEART RATE: 94 BPM | WEIGHT: 205 LBS | BODY MASS INDEX: 30.36 KG/M2 | SYSTOLIC BLOOD PRESSURE: 129 MMHG

## 2024-09-29 DIAGNOSIS — G89.18 POST-TONSILLECTOMY PAIN: Primary | ICD-10-CM

## 2024-09-29 DIAGNOSIS — R06.02 SOB (SHORTNESS OF BREATH): ICD-10-CM

## 2024-09-29 DIAGNOSIS — Z90.89 POST-TONSILLECTOMY PAIN: Primary | ICD-10-CM

## 2024-09-29 PROCEDURE — 99284 EMERGENCY DEPT VISIT MOD MDM: CPT | Mod: 25

## 2024-09-29 PROCEDURE — 99900035 HC TECH TIME PER 15 MIN (STAT)

## 2024-09-29 PROCEDURE — 25000003 PHARM REV CODE 250

## 2024-09-29 PROCEDURE — 63600175 PHARM REV CODE 636 W HCPCS

## 2024-09-29 PROCEDURE — 94799 UNLISTED PULMONARY SVC/PX: CPT

## 2024-09-29 PROCEDURE — 96372 THER/PROPH/DIAG INJ SC/IM: CPT

## 2024-09-29 RX ORDER — KETOROLAC TROMETHAMINE 30 MG/ML
15 INJECTION, SOLUTION INTRAMUSCULAR; INTRAVENOUS
Status: COMPLETED | OUTPATIENT
Start: 2024-09-29 | End: 2024-09-29

## 2024-09-29 RX ORDER — HYDROCODONE BITARTRATE AND ACETAMINOPHEN 7.5; 325 MG/15ML; MG/15ML
5 SOLUTION ORAL
Status: COMPLETED | OUTPATIENT
Start: 2024-09-29 | End: 2024-09-29

## 2024-09-29 RX ADMIN — HYDROCODONE BITARTRATE AND ACETAMINOPHEN 5 ML: 7.5; 325 SOLUTION ORAL at 06:09

## 2024-09-29 RX ADMIN — KETOROLAC TROMETHAMINE 15 MG: 30 INJECTION, SOLUTION INTRAMUSCULAR; INTRAVENOUS at 03:09

## 2024-09-29 NOTE — DISCHARGE INSTRUCTIONS
You were seen for post tonsillectomy pain today.  Continue to take your home pain medications as needed and follow up with your ENT surgeon.    You were also seen for shortness of breath.  Your chest x-ray shows atelectasis.  Atelectasis is a common postoperative complication and and can cause low-grade temperatures after an operation under anesthesia. Generally it is due to either bronchial obstruction with distal gas absorption or to hypoventilation.  A can cause a hard time breathing.  Sometimes having atelectasis can be complicated by low oxygen or pneumonia.  You did not have either of those today.  This should get better with time with breathing exercises.  Focus on taking deep slow breaths.  If you cigarettes, I advise you to stop to promote lung healing.  I do not think you have any evidence of infection, but you can continue to take the antibiotics that you were prescribed at the previous emergency department as they were prescribed to you.

## 2024-09-29 NOTE — ED PROVIDER NOTES
"Encounter Date: 9/29/2024       History     Chief Complaint   Patient presents with    Oral Swelling     Presents to the ED with complaints of oral swelling after having his tonsils pulled, tonsils were pulled yesterday, states that he has been having some bleeding with coughing. Family states EJ "told them that his lungs were collapsing but that he was good to go", Pt. Also endorses issues with breathing stating it is harder for him to breathe" Family is unable to explain what the  docs told the patient, pt. Has had hx of needing to be intubated for collapsed lungs before according to family     HPI  29-year-old male past status post tonsillectomy 2 days ago for recurrent strep pharyngitis presents with chief complaint of throat pain.  He also endorses shortness of breath and pain in his arms and legs.  Pt reports a fever yesterday of 100.8 for which he took tylenol. Denies chest pain. Of note, the pt was already seen 2x at an outside ED for this same problem and was seen by ENT during one of those visits. During those visits, he was also complaining of coughing up blood clots. The pt now says he has had a total of 4 episodes of coughing up a blood clot.     Review of patient's allergies indicates:  No Known Allergies  Past Medical History:   Diagnosis Date    Dehydration     Hypokalemia 7/31/2019    Migraine 7/31/2019    Seizure disorder 7/31/2019    Viral gastroenteritis 7/31/2019     History reviewed. No pertinent surgical history.  No family history on file.  Social History     Tobacco Use    Smoking status: Never     Passive exposure: Never    Smokeless tobacco: Never   Substance Use Topics    Alcohol use: Never    Drug use: Never     Review of Systems    Physical Exam     Initial Vitals [09/29/24 0216]   BP Pulse Resp Temp SpO2   132/87 110 (!) 24 98.2 °F (36.8 °C) 95 %      MAP       --         Physical Exam  Physical Exam:  CONSTITUTIONAL: Well developed, well nourished, in no acute distress.  HENT: " "Oropharynx patent with significant eschar. No obvious active bleeding.   NECK: Supple.   CARDIOVASCULAR: Regular rate and rhythm without any murmurs, gallops, rubs.  RESPIRATORY: Speaking in full sentences. Breathing comfortably. Auscultation of the lungs revealed normal breath sounds b/l, no wheezing, no rales, no rhonchi.  ABDOMEN: Soft and nontender.  NEUROLOGIC: Alert, interacting normally. No facial droop. Voice is clear. Speech is fluent.  MSK: Moving all four extremities.     ED Course   Procedures  Labs Reviewed - No data to display         Imaging Results              X-Ray Chest AP Portable (Final result)  Result time 09/29/24 06:31:24      Final result by Cale Raines MD (09/29/24 06:31:24)                   Impression:      No acute cardiopulmonary finding identified on this hypoventilatory limited single view.  Follow-up PA and lateral views may provide improved sensitivity for pneumonia if there is strong clinical concern.      Electronically signed by: Cale Raines MD  Date:    09/29/2024  Time:    06:31               Narrative:    EXAMINATION:  XR CHEST AP PORTABLE    CLINICAL HISTORY:  Provided history is "  Shortness of breath".    TECHNIQUE:  One view of the chest.    COMPARISON:  05/22/2024.    FINDINGS:  Cardiomediastinal silhouette is not enlarged.  Lung volumes are low, accentuating basilar markings.  Probable bibasilar subsegmental atelectasis.  No confluent area of consolidation.  No large pleural effusion.  No pneumothorax.                                       Medications   ketorolac injection 15 mg (15 mg Intramuscular Given 9/29/24 0352)   hydrocodone-apap 7.5-325 MG/15 ML oral solution 5 mL (5 mLs Oral Given 9/29/24 0645)     Medical Decision Making  29 y.o. male who presents to the emergency department status post tonsillectomy 9/27/24 with chief complaint of throat pain. Also with some shortness of breath. VSS. He is afebrile. Satting 95-97% on RA. Initially tachycardic to " 110. Pt was already seen 2x at an outside ED for this same problem and was seen by ENT during one of those visits. He had an unremarkable CBC, CMP, Lactic acid earlier tonight. Pt phonating appropriately and in no acute distress. PE remarkable for oropharynx eschar without active bleeding. Lungs CTAB. Will treat his pain. We are unable to access his CXR performed earlier tonight at outside ED. CXR here now with probable bibasilar subsegmental atelectasis.  No confluent area of consolidation. This is consistent with recent surgery. Pt was given spirometer and instructed on how to use it. Pt's pain improved with IM toradol and hyacet. He is stable for discharge to home.     Amount and/or Complexity of Data Reviewed  Radiology: ordered.    Risk  Prescription drug management.                                      Clinical Impression:  Final diagnoses:  [R06.02] SOB (shortness of breath)  [G89.18, Z90.89] Post-tonsillectomy pain (Primary)          ED Disposition Condition    Discharge Stable          ED Prescriptions    None       Follow-up Information    None          Viktoria Melara MD  Resident  09/29/24 5147

## 2024-09-29 NOTE — ED TRIAGE NOTES
"Flo Wayne, a 29 y.o. male presents to the ED w/ complaint of oral swelling. Had tonsils removed on yesterday.    Triage note:  Chief Complaint   Patient presents with    Oral Swelling     Presents to the ED with complaints of oral swelling after having his tonsils pulled, tonsils were pulled yesterday, states that he has been having some bleeding with coughing. Family states CHRISTA "told them that his lungs were collapsing but that he was good to go", Pt. Also endorses issues with breathing stating it is harder for him to breathe" Family is unable to explain what the  docs told the patient, pt. Has had hx of needing to be intubated for collapsed lungs before according to family     Review of patient's allergies indicates:  No Known Allergies  Past Medical History:   Diagnosis Date    Dehydration     Hypokalemia 7/31/2019    Migraine 7/31/2019    Seizure disorder 7/31/2019    Viral gastroenteritis 7/31/2019     "

## 2024-12-15 ENCOUNTER — HOSPITAL ENCOUNTER (EMERGENCY)
Facility: HOSPITAL | Age: 29
Discharge: HOME | End: 2024-12-15
Attending: EMERGENCY MEDICINE
Payer: COMMERCIAL

## 2024-12-15 VITALS
BODY MASS INDEX: 30.81 KG/M2 | RESPIRATION RATE: 22 BRPM | TEMPERATURE: 99 F | SYSTOLIC BLOOD PRESSURE: 124 MMHG | OXYGEN SATURATION: 96 % | WEIGHT: 208 LBS | HEART RATE: 101 BPM | DIASTOLIC BLOOD PRESSURE: 75 MMHG | HEIGHT: 69 IN

## 2024-12-15 DIAGNOSIS — R11.2 NAUSEA VOMITING AND DIARRHEA: ICD-10-CM

## 2024-12-15 DIAGNOSIS — A41.9 SEPSIS: ICD-10-CM

## 2024-12-15 DIAGNOSIS — R50.9 ACUTE FEBRILE ILLNESS: Primary | ICD-10-CM

## 2024-12-15 DIAGNOSIS — R19.7 NAUSEA VOMITING AND DIARRHEA: ICD-10-CM

## 2024-12-15 DIAGNOSIS — R10.30 LOWER ABDOMINAL PAIN: ICD-10-CM

## 2024-12-15 LAB
ALBUMIN SERPL BCP-MCNC: 3.9 G/DL (ref 3.5–5.2)
ALP SERPL-CCNC: 87 U/L (ref 40–150)
ALT SERPL W/O P-5'-P-CCNC: 41 U/L (ref 10–44)
ANION GAP SERPL CALC-SCNC: 11 MMOL/L (ref 8–16)
AST SERPL-CCNC: 24 U/L (ref 10–40)
BASOPHILS # BLD AUTO: 0.04 K/UL (ref 0–0.2)
BASOPHILS NFR BLD: 0.3 % (ref 0–1.9)
BILIRUB SERPL-MCNC: 0.9 MG/DL (ref 0.1–1)
BILIRUB UR QL STRIP: NEGATIVE
BUN SERPL-MCNC: 12 MG/DL (ref 6–20)
CALCIUM SERPL-MCNC: 8.9 MG/DL (ref 8.7–10.5)
CHLORIDE SERPL-SCNC: 108 MMOL/L (ref 95–110)
CLARITY UR: CLEAR
CO2 SERPL-SCNC: 19 MMOL/L (ref 23–29)
COLOR UR: YELLOW
CREAT SERPL-MCNC: 1.1 MG/DL (ref 0.5–1.4)
CRP SERPL-MCNC: 6.1 MG/L (ref 0–8.2)
CTP QC/QA: YES
DIFFERENTIAL METHOD BLD: ABNORMAL
EOSINOPHIL # BLD AUTO: 0.1 K/UL (ref 0–0.5)
EOSINOPHIL NFR BLD: 0.4 % (ref 0–8)
ERYTHROCYTE [DISTWIDTH] IN BLOOD BY AUTOMATED COUNT: 12.8 % (ref 11.5–14.5)
EST. GFR  (NO RACE VARIABLE): >60 ML/MIN/1.73 M^2
GLUCOSE SERPL-MCNC: 107 MG/DL (ref 70–110)
GLUCOSE UR QL STRIP: NEGATIVE
HCT VFR BLD AUTO: 44.6 % (ref 40–54)
HGB BLD-MCNC: 15.4 G/DL (ref 14–18)
HGB UR QL STRIP: NEGATIVE
IMM GRANULOCYTES # BLD AUTO: 0.06 K/UL (ref 0–0.04)
IMM GRANULOCYTES NFR BLD AUTO: 0.4 % (ref 0–0.5)
INFLUENZA A, MOLECULAR: NEGATIVE
INFLUENZA B, MOLECULAR: NEGATIVE
KETONES UR QL STRIP: NEGATIVE
LACTATE SERPL-SCNC: 2.2 MMOL/L (ref 0.5–2.2)
LDH SERPL L TO P-CCNC: 1.8 MMOL/L (ref 0.5–2.2)
LEUKOCYTE ESTERASE UR QL STRIP: NEGATIVE
LIPASE SERPL-CCNC: 18 U/L (ref 4–60)
LYMPHOCYTES # BLD AUTO: 0.9 K/UL (ref 1–4.8)
LYMPHOCYTES NFR BLD: 5.9 % (ref 18–48)
MCH RBC QN AUTO: 29.3 PG (ref 27–31)
MCHC RBC AUTO-ENTMCNC: 34.5 G/DL (ref 32–36)
MCV RBC AUTO: 85 FL (ref 82–98)
MONOCYTES # BLD AUTO: 0.9 K/UL (ref 0.3–1)
MONOCYTES NFR BLD: 6.3 % (ref 4–15)
NEUTROPHILS # BLD AUTO: 12.7 K/UL (ref 1.8–7.7)
NEUTROPHILS NFR BLD: 86.7 % (ref 38–73)
NITRITE UR QL STRIP: NEGATIVE
NRBC BLD-RTO: 0 /100 WBC
PCO2 BLDA: 37.7 MMHG (ref 35–45)
PH SMN: 7.4 [PH] (ref 7.35–7.45)
PH UR STRIP: 6 [PH] (ref 5–8)
PLATELET # BLD AUTO: 282 K/UL (ref 150–450)
PMV BLD AUTO: 8.7 FL (ref 9.2–12.9)
PO2 BLDA: 57.8 MMHG (ref 40–60)
POC BASE DEFICIT: -1.1 MMOL/L (ref -2–2)
POC HCO3: 23.4 MMOL/L (ref 24–28)
POC PERFORMED BY: ABNORMAL
POC SATURATED O2: 92 % (ref 95–100)
POTASSIUM SERPL-SCNC: 3.7 MMOL/L (ref 3.5–5.1)
PROCALCITONIN SERPL IA-MCNC: 0.07 NG/ML
PROT SERPL-MCNC: 6.9 G/DL (ref 6–8.4)
PROT UR QL STRIP: NEGATIVE
RBC # BLD AUTO: 5.26 M/UL (ref 4.6–6.2)
SARS-COV-2 RDRP RESP QL NAA+PROBE: NEGATIVE
SODIUM SERPL-SCNC: 138 MMOL/L (ref 136–145)
SP GR UR STRIP: >1.03 (ref 1–1.03)
SPECIMEN SOURCE: ABNORMAL
SPECIMEN SOURCE: NORMAL
URN SPEC COLLECT METH UR: ABNORMAL
UROBILINOGEN UR STRIP-ACNC: NEGATIVE EU/DL
WBC # BLD AUTO: 14.7 K/UL (ref 3.9–12.7)

## 2024-12-15 PROCEDURE — 96375 TX/PRO/DX INJ NEW DRUG ADDON: CPT

## 2024-12-15 PROCEDURE — 80053 COMPREHEN METABOLIC PANEL: CPT | Performed by: EMERGENCY MEDICINE

## 2024-12-15 PROCEDURE — 25000003 PHARM REV CODE 250: Performed by: EMERGENCY MEDICINE

## 2024-12-15 PROCEDURE — 87635 SARS-COV-2 COVID-19 AMP PRB: CPT | Performed by: EMERGENCY MEDICINE

## 2024-12-15 PROCEDURE — 81003 URINALYSIS AUTO W/O SCOPE: CPT | Performed by: EMERGENCY MEDICINE

## 2024-12-15 PROCEDURE — 25500020 PHARM REV CODE 255: Performed by: EMERGENCY MEDICINE

## 2024-12-15 PROCEDURE — 85025 COMPLETE CBC W/AUTO DIFF WBC: CPT | Performed by: EMERGENCY MEDICINE

## 2024-12-15 PROCEDURE — 86140 C-REACTIVE PROTEIN: CPT | Performed by: EMERGENCY MEDICINE

## 2024-12-15 PROCEDURE — 93005 ELECTROCARDIOGRAM TRACING: CPT

## 2024-12-15 PROCEDURE — 87502 INFLUENZA DNA AMP PROBE: CPT | Performed by: EMERGENCY MEDICINE

## 2024-12-15 PROCEDURE — 63600175 PHARM REV CODE 636 W HCPCS: Performed by: EMERGENCY MEDICINE

## 2024-12-15 PROCEDURE — 99285 EMERGENCY DEPT VISIT HI MDM: CPT | Mod: 25

## 2024-12-15 PROCEDURE — 83605 ASSAY OF LACTIC ACID: CPT

## 2024-12-15 PROCEDURE — 93010 ELECTROCARDIOGRAM REPORT: CPT | Mod: ,,, | Performed by: STUDENT IN AN ORGANIZED HEALTH CARE EDUCATION/TRAINING PROGRAM

## 2024-12-15 PROCEDURE — 83605 ASSAY OF LACTIC ACID: CPT | Performed by: EMERGENCY MEDICINE

## 2024-12-15 PROCEDURE — 84145 PROCALCITONIN (PCT): CPT | Performed by: EMERGENCY MEDICINE

## 2024-12-15 PROCEDURE — 63700000 PHARM REV CODE 250 ALT 637 W/O HCPCS: Performed by: EMERGENCY MEDICINE

## 2024-12-15 PROCEDURE — 87040 BLOOD CULTURE FOR BACTERIA: CPT | Performed by: EMERGENCY MEDICINE

## 2024-12-15 PROCEDURE — 83690 ASSAY OF LIPASE: CPT | Performed by: EMERGENCY MEDICINE

## 2024-12-15 PROCEDURE — 99900035 HC TECH TIME PER 15 MIN (STAT)

## 2024-12-15 PROCEDURE — 96374 THER/PROPH/DIAG INJ IV PUSH: CPT

## 2024-12-15 PROCEDURE — 96361 HYDRATE IV INFUSION ADD-ON: CPT

## 2024-12-15 PROCEDURE — 82803 BLOOD GASES ANY COMBINATION: CPT

## 2024-12-15 RX ORDER — LIDOCAINE HYDROCHLORIDE 20 MG/ML
15 SOLUTION OROPHARYNGEAL ONCE
Status: COMPLETED | OUTPATIENT
Start: 2024-12-15 | End: 2024-12-15

## 2024-12-15 RX ORDER — AZITHROMYCIN 250 MG/1
500 TABLET, FILM COATED ORAL
Status: COMPLETED | OUTPATIENT
Start: 2024-12-15 | End: 2024-12-15

## 2024-12-15 RX ORDER — ONDANSETRON 4 MG/1
4 TABLET, ORALLY DISINTEGRATING ORAL EVERY 6 HOURS PRN
Qty: 10 TABLET | Refills: 0 | Status: SHIPPED | OUTPATIENT
Start: 2024-12-15

## 2024-12-15 RX ORDER — AZITHROMYCIN 250 MG/1
500 TABLET, FILM COATED ORAL DAILY
Qty: 4 TABLET | Refills: 0 | Status: SHIPPED | OUTPATIENT
Start: 2024-12-15 | End: 2024-12-17

## 2024-12-15 RX ORDER — ALUMINUM HYDROXIDE, MAGNESIUM HYDROXIDE, AND SIMETHICONE 1200; 120; 1200 MG/30ML; MG/30ML; MG/30ML
30 SUSPENSION ORAL ONCE
Status: COMPLETED | OUTPATIENT
Start: 2024-12-15 | End: 2024-12-15

## 2024-12-15 RX ORDER — MORPHINE SULFATE 4 MG/ML
4 INJECTION, SOLUTION INTRAMUSCULAR; INTRAVENOUS
Status: COMPLETED | OUTPATIENT
Start: 2024-12-15 | End: 2024-12-15

## 2024-12-15 RX ORDER — KETOROLAC TROMETHAMINE 30 MG/ML
15 INJECTION, SOLUTION INTRAMUSCULAR; INTRAVENOUS
Status: COMPLETED | OUTPATIENT
Start: 2024-12-15 | End: 2024-12-15

## 2024-12-15 RX ORDER — ONDANSETRON HYDROCHLORIDE 2 MG/ML
8 INJECTION, SOLUTION INTRAVENOUS
Status: COMPLETED | OUTPATIENT
Start: 2024-12-15 | End: 2024-12-15

## 2024-12-15 RX ORDER — DICYCLOMINE HYDROCHLORIDE 20 MG/1
20 TABLET ORAL 3 TIMES DAILY PRN
Qty: 14 TABLET | Refills: 0 | Status: SHIPPED | OUTPATIENT
Start: 2024-12-15 | End: 2025-01-14

## 2024-12-15 RX ADMIN — LIDOCAINE HYDROCHLORIDE 15 ML: 20 SOLUTION ORAL at 07:12

## 2024-12-15 RX ADMIN — ALUMINUM HYDROXIDE, MAGNESIUM HYDROXIDE, AND SIMETHICONE 30 ML: 200; 200; 20 SUSPENSION ORAL at 07:12

## 2024-12-15 RX ADMIN — AZITHROMYCIN DIHYDRATE 500 MG: 250 TABLET ORAL at 07:12

## 2024-12-15 RX ADMIN — MORPHINE SULFATE 4 MG: 4 INJECTION INTRAVENOUS at 05:12

## 2024-12-15 RX ADMIN — SODIUM CHLORIDE 2121 ML: 9 INJECTION, SOLUTION INTRAVENOUS at 06:12

## 2024-12-15 RX ADMIN — ONDANSETRON 8 MG: 2 INJECTION INTRAMUSCULAR; INTRAVENOUS at 05:12

## 2024-12-15 RX ADMIN — IOHEXOL 100 ML: 350 INJECTION, SOLUTION INTRAVENOUS at 06:12

## 2024-12-15 RX ADMIN — KETOROLAC TROMETHAMINE 15 MG: 30 INJECTION, SOLUTION INTRAMUSCULAR; INTRAVENOUS at 06:12

## 2024-12-15 NOTE — ED NOTES
Pt discharged from ED with all belongings. Reviewed discharge medications with pt/cg including medications.  Pt ambulatory from ED independently, gait steady. VSS, afebrile, and pt in no visible distress upon departure.

## 2024-12-15 NOTE — PROGRESS NOTES
I accepted hand off from Dr. Garcia for follow-up of pending labs and imaging.  Labs grossly benign, CT without any acute findings other than some colitis.  Patient given some Toradol, azithromycin, and GI cocktail here.  He is now tolerating p.o. with improvement in heart rate and reports feeling much better.  Informed patient and family of results as well as plan to discharge with prescriptions for azithromycin, Bentyl, Zofran, instructed on home management, over-the-counter medications, follow up with primary care physician, strict return precautions given.  Vital signs stable, patient comfortable with discharge at this time.      EKG independently interpreted by me pending Cardiology review:  Heart rate 121, sinus tachycardia, no ectopy, no ST changes, normal axis, minimal change from 07/19/2021            Imaging Results              CT Abdomen Pelvis With IV Contrast NO Oral Contrast (Final result)  Result time 12/15/24 07:18:58      Final result by Kana Emmanuel MD (12/15/24 07:18:58)                   Impression:      1. Relatively long segment inflammatory changes of the colon, most pronounced involving the descending and sigmoid colon in keeping with a nonspecific infectious or noninfectious inflammatory colitis.  Intraluminal fluid within normal caliber loops of colon, as may be seen the setting of diarrheal illness.  Note is made of normal appearance of the appendix in a patient with reported history of right lower quadrant abdominal pain.  2. Additional details, as provided in the body of the report.      Electronically signed by: Kana Emmanuel  Date:    12/15/2024  Time:    07:18               Narrative:    EXAMINATION:  CT ABDOMEN PELVIS WITH IV CONTRAST    CLINICAL HISTORY:  RLQ abdominal pain (Age >= 14y);    TECHNIQUE:  Low dose axial images, sagittal and coronal reformations were obtained from the lung bases to the pubic symphysis following the IV administration of 100 mL of Omnipaque  350    COMPARISON:  CT of the abdomen pelvis performed 06/28/2024    FINDINGS:  Lower chest: Unremarkable.    Liver: Unremarkable.    Gallbladder and bile ducts: Unremarkable. No biliary ductal dilatation.    Pancreas: Unremarkable.    Spleen: Unremarkable.    Adrenals: Unremarkable.    Kidneys: Unremarkable.    Lymph nodes: Scattered mildly prominent number, but small mesenteric and retroperitoneal lymph nodes are nonspecific and may be reactive in etiology.    Bowel and mesentery: No evidence of bowel obstruction.  Relatively long segment of colonic wall thickening, most pronounced involving the descending and proximal sigmoid colon.  Mucosal hyperemia and mild surrounding inflammatory changes noted.Additionally noted intraluminal fluid within loops of normal caliber colon, as may be seen the setting of diarrheal illness.  Normal caliber appendix.    Abdominal aorta: Unremarkable.    Inferior vena cava: Unremarkable.    Free fluid or free air: None.    Pelvis: Unremarkable.    Body wall: Unremarkable.    Bones: No definite acute findings. Modest degenerative change of the spine.                                       X-Ray Chest 1 View (Final result)  Result time 12/15/24 07:05:10      Final result by Kana Emmanuel MD (12/15/24 07:05:10)                   Impression:      No convincing evidence of acute cardiopulmonary disease.      Electronically signed by: Kana Emmanuel  Date:    12/15/2024  Time:    07:05               Narrative:    EXAMINATION:  XR CHEST 1 VIEW    CLINICAL HISTORY:  Sepsis;    TECHNIQUE:  Single frontal view of the chest was performed.    COMPARISON:  Chest radiograph performed 09/29/2024, 04:22 hours.    FINDINGS:  Monitoring leads overlie the chest    Cardiomediastinal contours appear to be within normal limits    Lungs essentially clear.  No definite pneumothorax or large volume pleural effusion.    No acute findings in the visualized abdomen.    Osseous and soft tissue structures  appear without definite acute change.

## 2024-12-15 NOTE — ED NOTES
"Patient reports he woke up this morning with a fever, right side pain, pain in abdomen and N/V/D. Patient reports taking Tylenol (1200mg) for the fever about 2 hours ago.Patient reports medical history of epilepsy, chronic migraines, "prone to pneumonia", and tonsil removal.  Patient reports even and unlabored. Patient AAOX4 at this time. Patient connected to monitor at this time. Call bell within reach.  "

## 2024-12-15 NOTE — DISCHARGE INSTRUCTIONS
Please make sure you are drinking plenty of fluids.  I recommend taking ibuprofen 600 mg every 6 hours with food and/or Tylenol 650 mg every 6 hours in addition to the prescribed medication to help manage your symptoms.  Please follow-up with your primary care physician for further evaluation and management.  Please return with any new or worsening symptoms.

## 2024-12-15 NOTE — ED NOTES
Report received per POOJA Calderon. Pt presenting to ED from home for c/o fever. Reports temp at home 103.5. Also c/o RLQ and Rt flank non radiating pain. Pt denies any other associated symptoms. Low grade fever and tachycardia assessed. Pt took Tylenol PTA. Pt in no visible distress at this time.

## 2024-12-15 NOTE — ED PROVIDER NOTES
Encounter Date: 12/15/2024       History     Chief Complaint   Patient presents with    Fever     To Ed from home. Pt c/o of new onset n/v/d unpon waking this morning. T max 103.5. Also c/o of intermittent epigastric pain and R side pain. Pt reports taking tylenol PTA.      HPI    This is a 29-year-old male history epilepsy compliant with Lamictal, presents the ER for evaluation acute febrile illness.  Patient reports that he has a normal state health earlier today went to bed.  Patient reports that he woke up with nausea vomiting diarrhea, febrile at 100.3 extremely weak and unwell.  He denies ever having symptoms this has severe before.  Reports sick contact at work but unsure if symptoms were similar.  He came the ER for further evaluation.    Review of patient's allergies indicates:  No Known Allergies  Past Medical History:   Diagnosis Date    Dehydration     Hypokalemia 7/31/2019    Migraine 7/31/2019    Seizure disorder 7/31/2019    Viral gastroenteritis 7/31/2019     No past surgical history on file.  No family history on file.  Social History     Tobacco Use    Smoking status: Never     Passive exposure: Never    Smokeless tobacco: Never   Substance Use Topics    Alcohol use: Never    Drug use: Never     Review of Systems   Constitutional:  Positive for chills, fatigue and fever.   Respiratory:  Positive for shortness of breath.    Gastrointestinal:  Positive for abdominal pain, diarrhea, nausea and vomiting.   All other systems reviewed and are negative.      Physical Exam     Initial Vitals [12/15/24 0528]   BP Pulse Resp Temp SpO2   131/79 (!) 135 20 (!) 100.8 °F (38.2 °C) 98 %      MAP       --         Physical Exam    Nursing note and vitals reviewed.  Constitutional: He appears well-developed and well-nourished. He appears distressed (appears uncomfortable from symptoms).   HENT:   Head: Normocephalic and atraumatic.   Eyes: Pupils are equal, round, and reactive to light.   Neck:   Normal range of  motion.  Cardiovascular:            Tachycardic rate and rhythm   Pulmonary/Chest: Breath sounds normal. No respiratory distress.   Abdominal: Abdomen is soft. He exhibits no distension.   Right lower quadrant tenderness to palpation   Musculoskeletal:         General: Normal range of motion.      Cervical back: Normal range of motion.     Neurological: He is alert and oriented to person, place, and time. He has normal strength. GCS score is 15. GCS eye subscore is 4. GCS verbal subscore is 5. GCS motor subscore is 6.   Skin: Skin is warm and dry. Capillary refill takes less than 2 seconds.   Psychiatric: He has a normal mood and affect. Thought content normal.         ED Course   Procedures  Labs Reviewed   CULTURE, BLOOD   CULTURE, BLOOD   INFLUENZA A & B BY MOLECULAR   CBC W/ AUTO DIFFERENTIAL   COMPREHENSIVE METABOLIC PANEL   LACTIC ACID, PLASMA   URINALYSIS, REFLEX TO URINE CULTURE   LIPASE   PROCALCITONIN   PHOSPHORUS   MAGNESIUM   C-REACTIVE PROTEIN   SARS-COV-2 RDRP GENE          Imaging Results    None          Medications   sodium chloride 0.9% bolus 30 mL/kg (Ideal) (has no administration in time range)   ondansetron injection 8 mg (has no administration in time range)   morphine injection 4 mg (has no administration in time range)     Medical Decision Making  29-year-old male presents the ER for evaluation of acute febrile illness abdominal pain nausea vomiting diarrhea tachycardia.  Onset earlier tonight when he woke up with these symptoms.  Reports he felt that he was in his normal state health today.  He does have a sick contact at work but unsure with what.  He reports he woke up with multiple episodes of vomiting and diarrhea and a fever as high as 100.3.  He took Tylenol and came the ER.  Patient arrived in the ER he appeared unwell he was tachycardic and febrile at 100.8. code sepsis was activated.  He does have moderate right lower quadrant abdominal tenderness on exam.  Concern for appendicitis,  UTI, cystitis nephrolithiasis, pyelonephritis low differential includes COVID flu pneumonia.  Will plan blood work cultures lactic IV fluids symptomatic support.  No source of infection this time, could possibly viral, will hold off antibiotics until more definitive source can be found or blood work concerning for sepsis    Amount and/or Complexity of Data Reviewed  Independent Historian: friend  External Data Reviewed: notes.  Labs: ordered. Decision-making details documented in ED Course.  Radiology: ordered and independent interpretation performed. Decision-making details documented in ED Course.  ECG/medicine tests: ordered and independent interpretation performed. Decision-making details documented in ED Course.    Risk  Prescription drug management.               ED Course as of 12/15/24 0548   [place] Dec 15, 2024   0546 Resting in bed, workup initiated.  Will sign out to 6:00 a.m. physician for reassessment and disposition [SE]      ED Course User Index  [SE] Elroy Garcia MD                           Clinical Impression:  Final diagnoses:  [A41.9] Sepsis  [R50.9] Acute febrile illness (Primary)                 Elroy Garcia MD  12/15/24 0548

## 2024-12-16 LAB
OHS QRS DURATION: 84 MS
OHS QTC CALCULATION: 448 MS

## 2024-12-20 LAB
BACTERIA BLD CULT: NORMAL
BACTERIA BLD CULT: NORMAL

## 2025-02-23 ENCOUNTER — HOSPITAL ENCOUNTER (EMERGENCY)
Facility: HOSPITAL | Age: 30
Discharge: HOME | End: 2025-02-24
Attending: EMERGENCY MEDICINE
Payer: COMMERCIAL

## 2025-02-23 DIAGNOSIS — R11.2 NAUSEA VOMITING AND DIARRHEA: Primary | ICD-10-CM

## 2025-02-23 DIAGNOSIS — R07.9 CHEST PAIN: ICD-10-CM

## 2025-02-23 DIAGNOSIS — R51.9 ACUTE NONINTRACTABLE HEADACHE, UNSPECIFIED HEADACHE TYPE: ICD-10-CM

## 2025-02-23 DIAGNOSIS — R10.84 GENERALIZED ABDOMINAL PAIN: ICD-10-CM

## 2025-02-23 DIAGNOSIS — E86.0 DEHYDRATION: ICD-10-CM

## 2025-02-23 DIAGNOSIS — R19.7 NAUSEA VOMITING AND DIARRHEA: Primary | ICD-10-CM

## 2025-02-23 PROCEDURE — 93005 ELECTROCARDIOGRAM TRACING: CPT

## 2025-02-23 PROCEDURE — 87502 INFLUENZA DNA AMP PROBE: CPT | Performed by: EMERGENCY MEDICINE

## 2025-02-23 PROCEDURE — 87635 SARS-COV-2 COVID-19 AMP PRB: CPT | Performed by: EMERGENCY MEDICINE

## 2025-02-23 PROCEDURE — 93010 ELECTROCARDIOGRAM REPORT: CPT | Mod: ,,, | Performed by: STUDENT IN AN ORGANIZED HEALTH CARE EDUCATION/TRAINING PROGRAM

## 2025-02-23 PROCEDURE — 99285 EMERGENCY DEPT VISIT HI MDM: CPT | Mod: 25

## 2025-02-23 NOTE — Clinical Note
"Flo Nix" Rosana was seen and treated in our emergency department on 2/23/2025.  He may return to work on 02/26/2025.       If you have any questions or concerns, please don't hesitate to call.       RN    "

## 2025-02-24 VITALS
HEIGHT: 69 IN | WEIGHT: 208 LBS | HEART RATE: 106 BPM | TEMPERATURE: 101 F | OXYGEN SATURATION: 95 % | BODY MASS INDEX: 30.81 KG/M2 | DIASTOLIC BLOOD PRESSURE: 58 MMHG | SYSTOLIC BLOOD PRESSURE: 124 MMHG | RESPIRATION RATE: 17 BRPM

## 2025-02-24 LAB
ALBUMIN SERPL BCP-MCNC: 3.7 G/DL (ref 3.5–5.2)
ALP SERPL-CCNC: 70 U/L (ref 40–150)
ALT SERPL W/O P-5'-P-CCNC: 60 U/L (ref 10–44)
ANION GAP SERPL CALC-SCNC: 11 MMOL/L (ref 8–16)
AST SERPL-CCNC: 23 U/L (ref 10–40)
BASOPHILS # BLD AUTO: 0.03 K/UL (ref 0–0.2)
BASOPHILS NFR BLD: 0.2 % (ref 0–1.9)
BILIRUB SERPL-MCNC: 1.9 MG/DL (ref 0.1–1)
BUN SERPL-MCNC: 15 MG/DL (ref 6–20)
CALCIUM SERPL-MCNC: 8.6 MG/DL (ref 8.7–10.5)
CHLORIDE SERPL-SCNC: 102 MMOL/L (ref 95–110)
CO2 SERPL-SCNC: 23 MMOL/L (ref 23–29)
CREAT SERPL-MCNC: 1.2 MG/DL (ref 0.5–1.4)
DIFFERENTIAL METHOD BLD: ABNORMAL
EOSINOPHIL # BLD AUTO: 0 K/UL (ref 0–0.5)
EOSINOPHIL NFR BLD: 0.1 % (ref 0–8)
ERYTHROCYTE [DISTWIDTH] IN BLOOD BY AUTOMATED COUNT: 13.3 % (ref 11.5–14.5)
EST. GFR  (NO RACE VARIABLE): >60 ML/MIN/1.73 M^2
GLUCOSE SERPL-MCNC: 104 MG/DL (ref 70–110)
HCT VFR BLD AUTO: 45.1 % (ref 40–54)
HGB BLD-MCNC: 15.4 G/DL (ref 14–18)
IMM GRANULOCYTES # BLD AUTO: 0.07 K/UL (ref 0–0.04)
IMM GRANULOCYTES NFR BLD AUTO: 0.6 % (ref 0–0.5)
INFLUENZA A, MOLECULAR: NEGATIVE
INFLUENZA B, MOLECULAR: NEGATIVE
LIPASE SERPL-CCNC: 9 U/L (ref 4–60)
LYMPHOCYTES # BLD AUTO: 1.1 K/UL (ref 1–4.8)
LYMPHOCYTES NFR BLD: 8.9 % (ref 18–48)
MAGNESIUM SERPL-MCNC: 1.4 MG/DL (ref 1.6–2.6)
MCH RBC QN AUTO: 29.2 PG (ref 27–31)
MCHC RBC AUTO-ENTMCNC: 34.1 G/DL (ref 32–36)
MCV RBC AUTO: 86 FL (ref 82–98)
MONOCYTES # BLD AUTO: 0.9 K/UL (ref 0.3–1)
MONOCYTES NFR BLD: 7.5 % (ref 4–15)
NEUTROPHILS # BLD AUTO: 10.2 K/UL (ref 1.8–7.7)
NEUTROPHILS NFR BLD: 82.7 % (ref 38–73)
NRBC BLD-RTO: 0 /100 WBC
OHS QRS DURATION: 84 MS
OHS QTC CALCULATION: 431 MS
PLATELET # BLD AUTO: 245 K/UL (ref 150–450)
PMV BLD AUTO: 9.2 FL (ref 9.2–12.9)
POTASSIUM SERPL-SCNC: 3.5 MMOL/L (ref 3.5–5.1)
PROT SERPL-MCNC: 7 G/DL (ref 6–8.4)
RBC # BLD AUTO: 5.27 M/UL (ref 4.6–6.2)
SARS-COV-2 RDRP RESP QL NAA+PROBE: NEGATIVE
SODIUM SERPL-SCNC: 136 MMOL/L (ref 136–145)
SPECIMEN SOURCE: NORMAL
TROPONIN I SERPL DL<=0.01 NG/ML-MCNC: 0.01 NG/ML (ref 0–0.03)
WBC # BLD AUTO: 12.31 K/UL (ref 3.9–12.7)

## 2025-02-24 PROCEDURE — 83690 ASSAY OF LIPASE: CPT | Performed by: EMERGENCY MEDICINE

## 2025-02-24 PROCEDURE — 85025 COMPLETE CBC W/AUTO DIFF WBC: CPT | Performed by: EMERGENCY MEDICINE

## 2025-02-24 PROCEDURE — 63600175 PHARM REV CODE 636 W HCPCS: Mod: JZ,TB | Performed by: EMERGENCY MEDICINE

## 2025-02-24 PROCEDURE — 84484 ASSAY OF TROPONIN QUANT: CPT | Performed by: EMERGENCY MEDICINE

## 2025-02-24 PROCEDURE — 25500020 PHARM REV CODE 255: Performed by: EMERGENCY MEDICINE

## 2025-02-24 PROCEDURE — 96374 THER/PROPH/DIAG INJ IV PUSH: CPT

## 2025-02-24 PROCEDURE — 80053 COMPREHEN METABOLIC PANEL: CPT | Performed by: EMERGENCY MEDICINE

## 2025-02-24 PROCEDURE — 25000003 PHARM REV CODE 250: Performed by: EMERGENCY MEDICINE

## 2025-02-24 PROCEDURE — 96375 TX/PRO/DX INJ NEW DRUG ADDON: CPT

## 2025-02-24 PROCEDURE — 83735 ASSAY OF MAGNESIUM: CPT | Performed by: EMERGENCY MEDICINE

## 2025-02-24 RX ORDER — KETOROLAC TROMETHAMINE 30 MG/ML
15 INJECTION, SOLUTION INTRAMUSCULAR; INTRAVENOUS
Status: COMPLETED | OUTPATIENT
Start: 2025-02-24 | End: 2025-02-24

## 2025-02-24 RX ORDER — METOCLOPRAMIDE HYDROCHLORIDE 5 MG/ML
10 INJECTION INTRAMUSCULAR; INTRAVENOUS
Status: DISCONTINUED | OUTPATIENT
Start: 2025-02-24 | End: 2025-02-24

## 2025-02-24 RX ORDER — DICYCLOMINE HYDROCHLORIDE 20 MG/1
20 TABLET ORAL 3 TIMES DAILY PRN
Qty: 14 TABLET | Refills: 0 | Status: SHIPPED | OUTPATIENT
Start: 2025-02-24 | End: 2025-03-26

## 2025-02-24 RX ORDER — METOCLOPRAMIDE 10 MG/1
10 TABLET ORAL EVERY 6 HOURS PRN
Qty: 14 TABLET | Refills: 0 | Status: SHIPPED | OUTPATIENT
Start: 2025-02-24

## 2025-02-24 RX ORDER — ONDANSETRON 4 MG/1
4 TABLET, ORALLY DISINTEGRATING ORAL EVERY 6 HOURS PRN
Qty: 10 TABLET | Refills: 0 | Status: SHIPPED | OUTPATIENT
Start: 2025-02-24

## 2025-02-24 RX ORDER — ALUMINUM HYDROXIDE, MAGNESIUM HYDROXIDE, AND SIMETHICONE 1200; 120; 1200 MG/30ML; MG/30ML; MG/30ML
30 SUSPENSION ORAL ONCE
Status: COMPLETED | OUTPATIENT
Start: 2025-02-24 | End: 2025-02-24

## 2025-02-24 RX ORDER — HALOPERIDOL 5 MG/ML
5 INJECTION INTRAMUSCULAR
Status: COMPLETED | OUTPATIENT
Start: 2025-02-24 | End: 2025-02-24

## 2025-02-24 RX ORDER — BUTALBITAL, ACETAMINOPHEN AND CAFFEINE 50; 325; 40 MG/1; MG/1; MG/1
1 TABLET ORAL EVERY 6 HOURS PRN
Qty: 14 TABLET | Refills: 0 | Status: SHIPPED | OUTPATIENT
Start: 2025-02-24 | End: 2025-03-26

## 2025-02-24 RX ORDER — LIDOCAINE HYDROCHLORIDE 20 MG/ML
15 SOLUTION OROPHARYNGEAL ONCE
Status: COMPLETED | OUTPATIENT
Start: 2025-02-24 | End: 2025-02-24

## 2025-02-24 RX ORDER — ACETAMINOPHEN 325 MG/1
650 TABLET ORAL
Status: COMPLETED | OUTPATIENT
Start: 2025-02-24 | End: 2025-02-24

## 2025-02-24 RX ADMIN — LIDOCAINE HYDROCHLORIDE 15 ML: 20 SOLUTION ORAL at 01:02

## 2025-02-24 RX ADMIN — ACETAMINOPHEN 650 MG: 325 TABLET ORAL at 02:02

## 2025-02-24 RX ADMIN — IOHEXOL 100 ML: 350 INJECTION, SOLUTION INTRAVENOUS at 01:02

## 2025-02-24 RX ADMIN — HALOPERIDOL LACTATE 5 MG: 5 INJECTION, SOLUTION INTRAMUSCULAR at 12:02

## 2025-02-24 RX ADMIN — ALUMINUM HYDROXIDE, MAGNESIUM HYDROXIDE, AND SIMETHICONE 30 ML: 200; 200; 20 SUSPENSION ORAL at 01:02

## 2025-02-24 RX ADMIN — KETOROLAC TROMETHAMINE 15 MG: 30 INJECTION, SOLUTION INTRAMUSCULAR; INTRAVENOUS at 01:02

## 2025-02-24 NOTE — ED PROVIDER NOTES
Encounter Date: 2/23/2025       History     Chief Complaint   Patient presents with    Emesis     Patient reports having continuous n/v and diarrhea since this morning. He is also c/o chest pain across his chest that radiates to the back. Denies fever, congestion, cough, dyspnea.     29-year-old male presents emergency department complaining of nausea, vomiting, diarrhea.  Onset around 4:00 a.m. this morning.  Notes multiple episodes of nonbloody, nonbilious emesis as well as several episodes of loose, nonbloody diarrhea.  States he has begun having body aches including chest and back pain.  He feels lightheaded and generally weak.  Denies any fever or shortness of breath.  Denies any cough or congestion.  Notes generalized, cramping abdominal pain that comes and goes, mostly with bowel movements.  No other symptoms reported at this time.  Denies any urinary complaints.      Review of patient's allergies indicates:  No Known Allergies  Past Medical History:   Diagnosis Date    Dehydration     Hypokalemia 7/31/2019    Migraine 7/31/2019    Seizure disorder 7/31/2019    Viral gastroenteritis 7/31/2019     History reviewed. No pertinent surgical history.  No family history on file.  Social History[1]  Review of Systems   Constitutional:  Positive for fatigue. Negative for chills.   HENT:  Negative for congestion.    Respiratory:  Negative for cough and shortness of breath.    Cardiovascular:  Positive for chest pain.   Gastrointestinal:  Positive for abdominal pain.   Musculoskeletal:  Positive for back pain.   Neurological:  Negative for headaches.       Physical Exam     Initial Vitals [02/23/25 2339]   BP Pulse Resp Temp SpO2   (!) 142/81 (!) 120 18 99.8 °F (37.7 °C) 97 %      MAP       --         Physical Exam    Nursing note and vitals reviewed.  Constitutional: He appears well-developed and well-nourished. No distress.   HENT:   Head: Normocephalic and atraumatic.   Eyes: Conjunctivae and EOM are normal. Pupils  are equal, round, and reactive to light.   Neck: Neck supple. No tracheal deviation present.   Normal range of motion.  Cardiovascular:  Regular rhythm and intact distal pulses.           Pulmonary/Chest: No respiratory distress.   Abdominal: Abdomen is soft. He exhibits no distension. There is abdominal tenderness (Generalized). There is no rebound and no guarding.   Musculoskeletal:         General: No tenderness or edema. Normal range of motion.      Cervical back: Normal range of motion and neck supple.     Neurological: He is alert and oriented to person, place, and time. He has normal strength. No cranial nerve deficit. GCS score is 15. GCS eye subscore is 4. GCS verbal subscore is 5. GCS motor subscore is 6.   Skin: Skin is warm and dry.         ED Course   Procedures  Labs Reviewed   CBC W/ AUTO DIFFERENTIAL - Abnormal       Result Value    WBC 12.31      RBC 5.27      Hemoglobin 15.4      Hematocrit 45.1      MCV 86      MCH 29.2      MCHC 34.1      RDW 13.3      Platelets 245      MPV 9.2      Immature Granulocytes 0.6 (*)     Gran # (ANC) 10.2 (*)     Immature Grans (Abs) 0.07 (*)     Lymph # 1.1      Mono # 0.9      Eos # 0.0      Baso # 0.03      nRBC 0      Gran % 82.7 (*)     Lymph % 8.9 (*)     Mono % 7.5      Eosinophil % 0.1      Basophil % 0.2      Differential Method Automated     COMPREHENSIVE METABOLIC PANEL - Abnormal    Sodium 136      Potassium 3.5      Chloride 102      CO2 23      Glucose 104      BUN 15      Creatinine 1.2      Calcium 8.6 (*)     Total Protein 7.0      Albumin 3.7      Total Bilirubin 1.9 (*)     Alkaline Phosphatase 70      AST 23      ALT 60 (*)     eGFR >60      Anion Gap 11     MAGNESIUM - Abnormal    Magnesium 1.4 (*)    INFLUENZA A & B BY MOLECULAR    Influenza A, Molecular Negative      Influenza B, Molecular Negative      Flu A & B Source Nasal swab     TROPONIN I    Troponin I 0.007     LIPASE    Lipase 9     SARS-COV-2 RNA AMPLIFICATION, QUAL    SARS-CoV-2 RNA,  Amplification, Qual Negative       EKG Readings: (Independently Interpreted)   Initial Reading: No STEMI. Previous EKG: Compared with most recent EKG Previous EKG Date: 12/15/2024 (Minimal change). Rhythm: Sinus Tachycardia. Heart Rate: 131. Ectopy: No Ectopy. ST Segments: Normal ST Segments. Axis: Normal.   EKG independently interpreted by me pending Cardiology review           X-Rays:   Independently Interpreted Readings:   Other Readings:  Chest x-ray independently interpreted by me pending radiology review: No acute infiltrate, no pneumothorax, no effusion    Imaging Results              CT Abdomen Pelvis With IV Contrast NO Oral Contrast (Final result)  Result time 02/24/25 01:30:21      Final result by Zoran Guillen DO (02/24/25 01:30:21)                   Impression:      No acute abnormality of the abdomen or pelvis.      Electronically signed by: Zoran Guillen  Date:    02/24/2025  Time:    01:30               Narrative:    EXAMINATION:  CT ABDOMEN PELVIS WITH IV CONTRAST    CLINICAL HISTORY:  Nausea/vomiting;Abdominal pain, acute, nonlocalized;    TECHNIQUE:  Axial CT images with sagittal and coronal reformats were obtained of the abdomen and pelvis from the hemidiaphragms through the symphysis pubis after the administration of 100mL Omnipaque 350.    COMPARISON:  CT abdomen pelvis from 12/15/2024.    FINDINGS:  Lung Bases: Clear.    Heart: Heart size is normal.  No pericardial effusion.    Liver: The liver is normal in size and demonstrates homogeneous enhancement without focal lesion.  The portal vasculature is patent.    Biliary tract: No intrahepatic or extrahepatic biliary ductal dilatation.    Gallbladder: No radiodense gallstone. No wall thickening or pericholecystic fluid.    Pancreas: Normal. No pancreatic ductal dilatation.    Spleen: Normal size without focal lesion.    Adrenals: Unremarkable.    Kidneys and urinary collecting systems: Normal.  No hydronephrosis or urolithiasis.    Lymph  nodes: None enlarged.    Stomach and bowel: The stomach is normal.  Loops of small and large bowel are normal in caliber without evidence for inflammation or obstruction.  The appendix is normal.    Peritoneum and mesentery: No ascites or free intraperitoneal air.  No abdominal fluid collection.    Vasculature: No aneurysm or significant atherosclerosis.    Urinary bladder: No wall thickening.    Reproductive organs: Prostate is unremarkable.    Body wall: No abnormality.    Musculoskeletal: No aggressive osseous lesion.                                       X-Ray Chest AP Portable (Final result)  Result time 02/24/25 00:45:53      Final result by Zoran Guillen DO (02/24/25 00:45:53)                   Impression:      No acute abnormality.      Electronically signed by: Zoran Guillen  Date:    02/24/2025  Time:    00:45               Narrative:    EXAMINATION:  XR CHEST AP PORTABLE    CLINICAL HISTORY:  Chest Pain;    TECHNIQUE:  Single frontal view of the chest was performed.    COMPARISON:  12/15/2024.    FINDINGS:  The lungs are well expanded and clear. No focal opacities are seen. The pleural spaces are clear. The cardiac silhouette is unremarkable. The visualized osseous structures are unremarkable.                                      Medications   haloperidol lactate injection 5 mg (5 mg Intravenous Given 2/24/25 0029)   ketorolac injection 15 mg (15 mg Intravenous Given 2/24/25 0100)   iohexoL (OMNIPAQUE 350) injection 100 mL (100 mLs Intravenous Given 2/24/25 0121)   aluminum-magnesium hydroxide-simethicone 200-200-20 mg/5 mL suspension 30 mL (30 mLs Oral Given 2/24/25 0143)     And   LIDOcaine viscous HCl 2% oral solution 15 mL (15 mLs Oral Given 2/24/25 0143)     Medical Decision Making  29-year-old male presents emergency department complaining of nausea, vomiting, diarrhea, chest pain, body aches, abdominal      Differential: URI, COVID, influenza, viral syndrome, diverticulitis, cholecystitis,  pancreatitis, appendicitis, obstruction, constipation UTI, pyelonephritis, kidney stone, gastroenteritis, ACS, dissection, pneumonia, pneumothorax, musculoskeletal, GERD      Patient given IV fluid, antiemetic, Toradol, GI cocktail.  I did end up getting a CT due to transaminitis, fortunately CT did not reveal any biliary pathology.  Patient tolerating p.o. with improvement in vital signs, states he is feeling moderately improved.  Still a little bit tachycardic, likely secondary to dehydration but patient is tolerating p.o. and we are still fluid restricted on IV fluid bags.  Discussed disposition with patient and family including discharge with prescriptions for Zofran, Reglan, Bentyl, Fioricet, instructed on home management, over-the-counter medications, increasing oral hydration, follow up with primary care physician, strict return precautions given.  Vital signs stable, patient and family comfortable with discharge at this time.    Problems Addressed:  Acute nonintractable headache, unspecified headache type: acute illness or injury  Dehydration: acute illness or injury  Generalized abdominal pain: acute illness or injury  Nausea vomiting and diarrhea: acute illness or injury    Amount and/or Complexity of Data Reviewed  External Data Reviewed: ECG and notes.     Details: Reviewed most recent EKG for comparison     Reviewed most recent ENT note documenting baseline medications and past medical history  Labs: ordered.     Details: CBC without leukocytosis, normal H&H; CMP with normal renal function tests, transaminitis, normal electrolytes; troponin within normal limits; COVID and influenza negative  Radiology: ordered and independent interpretation performed. Decision-making details documented in ED Course.  ECG/medicine tests: ordered and independent interpretation performed. Decision-making details documented in ED Course.    Risk  OTC drugs.  Prescription drug management.  Parenteral controlled  substances.                                      Clinical Impression:  Final diagnoses:  [R07.9] Chest pain  [R11.2, R19.7] Nausea vomiting and diarrhea (Primary)  [E86.0] Dehydration  [R51.9] Acute nonintractable headache, unspecified headache type  [R10.84] Generalized abdominal pain          ED Disposition Condition    Discharge Stable          ED Prescriptions       Medication Sig Dispense Start Date End Date Auth. Provider    dicyclomine (BENTYL) 20 mg tablet Take 1 tablet (20 mg total) by mouth 3 (three) times daily as needed (for abdominal cramping). 14 tablet 2/24/2025 3/26/2025 Tonny Lewis MD    metoclopramide HCl (REGLAN) 10 MG tablet Take 1 tablet (10 mg total) by mouth every 6 (six) hours as needed (Nausea). 14 tablet 2/24/2025 -- Tonny Lewis MD    ondansetron (ZOFRAN-ODT) 4 MG TbDL Take 1 tablet (4 mg total) by mouth every 6 (six) hours as needed (Nausea). 10 tablet 2/24/2025 -- Tonny Lewis MD    butalbital-acetaminophen-caffeine -40 mg (FIORICET, ESGIC) -40 mg per tablet Take 1 tablet by mouth every 6 (six) hours as needed for Pain. 14 tablet 2/24/2025 3/26/2025 Tonny Lewis MD          Follow-up Information       Follow up With Specialties Details Why Contact Info    Your primary care physician  Schedule an appointment as soon as possible for a visit                    [1]   Social History  Tobacco Use    Smoking status: Never     Passive exposure: Never    Smokeless tobacco: Never   Substance Use Topics    Alcohol use: Never    Drug use: Never        Tonny Lewis MD  02/24/25 0154

## 2025-02-24 NOTE — DISCHARGE INSTRUCTIONS
Please make sure you are drinking plenty of fluids such as Gatorade or Powerade.  I recommend taking ibuprofen 600 mg every 6 hours with food in addition to the prescribed medication as needed to help manage your symptoms.  Please follow-up with your primary care physician for further evaluation and management.  Please return with any new or worsening symptoms.

## 2025-06-30 ENCOUNTER — HOSPITAL ENCOUNTER (EMERGENCY)
Facility: HOSPITAL | Age: 30
Discharge: HOME | End: 2025-06-30
Attending: EMERGENCY MEDICINE
Payer: COMMERCIAL

## 2025-06-30 VITALS
RESPIRATION RATE: 16 BRPM | HEART RATE: 98 BPM | HEIGHT: 69 IN | TEMPERATURE: 98 F | SYSTOLIC BLOOD PRESSURE: 126 MMHG | BODY MASS INDEX: 31.1 KG/M2 | OXYGEN SATURATION: 96 % | DIASTOLIC BLOOD PRESSURE: 86 MMHG | WEIGHT: 210 LBS

## 2025-06-30 DIAGNOSIS — N61.0 CELLULITIS OF RIGHT BREAST: Primary | ICD-10-CM

## 2025-06-30 PROCEDURE — 99283 EMERGENCY DEPT VISIT LOW MDM: CPT

## 2025-06-30 RX ORDER — SULFAMETHOXAZOLE AND TRIMETHOPRIM 800; 160 MG/1; MG/1
1 TABLET ORAL 2 TIMES DAILY
Qty: 14 TABLET | Refills: 0 | Status: SHIPPED | OUTPATIENT
Start: 2025-06-30 | End: 2025-07-07

## 2025-06-30 NOTE — DISCHARGE INSTRUCTIONS
Today you were prescribed an antibiotic for skin infection.  Please take this antibiotic as prescribed for the full course to ensure complete resolution of your bacterial infection. Be sure to take it at the same time every day to maintain consistent levels in your body.     While taking the medication, be sure to eat a snack or meal with each dose. DO NOT DRINK ALCOHOL, as it may interact negatively with some antibiotics. After taking an antibiotic you may need to wait for up to three hours before eating or drinking any dairy products. Grapefruit juice and dietary supplements containing minerals like calcium may also lessen the effect of antibiotics. Over the next few days, try to incorporate probiotics to help with your gut health because antibiotics can decrease good bacteria in your gut. Examples include: yogurt, kombucha, pickles, Poppi probiotic sodas, etc.     Your symptoms should start to improve within 48-72 hours of starting the antibiotic.  However, may take a bit longer to feel complete resolution of your symptoms.  If you start to experience severe abdominal pain, diarrhea, fever, body aches, chills, vomiting, or other new or worsening symptoms, please return to the emergency room.    Thank you for allowing me and my emergency team to take care of you here today! I hope you feel better soon. Please do not hesitate to return with any additional concerns that may arise from this or any new problem you encounter.    Our goal in the emergency department is to always give you outstanding care and exceptional service. If you receive a survey by mail or e-mail in the next week regarding your experience in our ED, we would greatly appreciate you completing it. Your feedback provides us with a way to recognize our staff who give very good care and it helps us learn how to improve when your experience was below the excellence we aspire to be!    Brook [place], PA-C [facility], [facility], and [facility]    Emergency Room Physician Assistant

## 2025-06-30 NOTE — ED PROVIDER NOTES
Encounter Date: 6/30/2025       History     Chief Complaint   Patient presents with    Rash     Pt c/o pain and skin redness/irritation noted to right chest x 3 days. Denies fever or other complaint     Patient is a 30-year-old male with a past medical history of dehydration, migraine, seizure disorder, viral gastroenteritis who presents to emergency room for pain and redness to right breast/nipple area that onset about 3 days ago.  Patient states that he noticed a bump to the area that was painful.  Over the next few days, it started to become more red.  Denies fever, body aches, or chills.  No nipple changes.  No nipple discharge.  No recent weight changes.  No new topical medications or lotions.  New medications taken prior to arrival.    The history is provided by the patient. No  was used.     Review of patient's allergies indicates:  No Known Allergies  Past Medical History:   Diagnosis Date    Dehydration     Hypokalemia 7/31/2019    Migraine 7/31/2019    Seizure disorder 7/31/2019    Viral gastroenteritis 7/31/2019     History reviewed. No pertinent surgical history.  No family history on file.  Social History[1]  Review of Systems   Constitutional:  Negative for chills, diaphoresis, fatigue and fever.   Musculoskeletal:  Negative for arthralgias, joint swelling and myalgias.   Skin:  Positive for color change (redness). Negative for rash and wound.   Neurological:  Negative for weakness and numbness.       Physical Exam     Initial Vitals [06/30/25 1550]   BP Pulse Resp Temp SpO2   139/89 107 18 97.9 °F (36.6 °C) 98 %      MAP       --         Physical Exam    Nursing note and vitals reviewed.  Constitutional: He appears well-developed and well-nourished. He is not diaphoretic. No distress.   HENT:   Head: Normocephalic and atraumatic.   Right Ear: External ear normal.   Left Ear: External ear normal.   Eyes: Conjunctivae and EOM are normal.   Neck: Neck supple.   Normal range of  motion.  Pulmonary/Chest: No respiratory distress.   Right breast exhibits skin change and tenderness. Right breast exhibits no inverted nipple and no nipple discharge. Left breast exhibits no skin change and no tenderness.   Musculoskeletal:         General: No tenderness or edema. Normal range of motion.      Cervical back: Normal range of motion and neck supple.     Neurological: He is alert and oriented to person, place, and time. He has normal strength.   Skin: Skin is warm. Capillary refill takes less than 2 seconds.   Psychiatric: He has a normal mood and affect. His behavior is normal. Thought content normal.         ED Course   Procedures  Labs Reviewed - No data to display       Imaging Results    None          Medications - No data to display  Medical Decision Making  Patient presents to emergency room for skin change surrounding right nipple.  Vital signs stable and within normal limits.  Physical exam as stated above.    Differential Diagnosis includes, but is not limited to necrotizing fasciitis, erythema multiforme, Hauser-Marc syndrome, toxic epidermal necrolysis, cellulitis, abscess, osteomyelitis, septic joint, MRSA, DVT, drug eruption, allergic reaction/urticatia, irritant/contact dermatitis, viral exanthem, local trauma/contusion, or abrasion.  Rash not suggestive of necrotizing fasciitis, erythema multiform, Beto Marc syndrome, or toxic epidermal necrolysis. No joint involvement that would suggest septic joint.  No extremity edema that would suggest DVT.  I do not suspect drug eruption, as patient has not started any new medications.  Ultrasound done at bedside which did not show abscess.  There was cobblestoning.  Most suggestive of cellulitis.  Patient does not have any nipple changes or masses felt on my examination. Clinical presentation and physical exam most suggestive of cellulitis of right breast area.  However, I do discussed with patient that I can not rule out malignancy at  this time due to area of skin changes.  There are no nipple changes at this time.  No masses felt.  However, referral will be placed a breast surgery.  I will prescribe Bactrim to use upon discharge.  Advised on wound care and conservative management such as warm compresses.    I see no indication of an emergent process beyond that addressed during our encounter. Patient stable for discharge at this time. I have counseled the patient regarding follow up with primary care and gave strict return precautions. I have discussed the final diagnosis and gave instructions regarding prescribed medications. Patient verbalized understanding and is agreeable.     Problems Addressed:  Cellulitis of right breast: acute illness or injury    Amount and/or Complexity of Data Reviewed  External Data Reviewed: notes.     Details: Patient receives primary care with [facility].  Unable to access these records.  Labs:      Details: Considered ordering lab work.  However, patient afebrile and clinically well-appearing.  Does not meet SIRS criteria.  No vomiting or diarrhea.  Adequate p.o. intake.  Low suspicion for metabolic derangements or dehydration.    Risk  Prescription drug management.  Risk Details: Comorbidities taken into consideration during the patient's evaluation and treatment include dehydration, migraine, seizures, and viral gastroenteritis.    Social determinants of health taken into consideration during development of our treatment plan include difficulty in obtaining follow-up, obtaining medications, health literacy, access to healthy options for preventative/conservative management, and/or support systems due to, but not limited to, transportation limitations, socioeconomic status, and environmental factors.                                       Clinical Impression:  Final diagnoses:  [N61.0] Cellulitis of right breast (Primary)          ED Disposition Condition    Discharge Stable          ED Prescriptions        Medication Sig Dispense Start Date End Date Auth. Provider    sulfamethoxazole-trimethoprim 800-160mg (BACTRIM DS) 800-160 mg Tab Take 1 tablet by mouth 2 (two) times daily. for 7 days 14 tablet 6/30/2025 7/7/2025 Bushra Recinos PA-C          Follow-up Information       Follow up With Specialties Details Why Contact Info Additional Information    [facility]   [address]  [address]  [facility] [address] [phone] Please park in [address] or D and use Josette martinez. Take Medical Office Bldg elevators.             This note was partially created using [employer] Voice Recognition software. Typographical and content errors may occur with this process. While efforts are made to detect and correct such errors, in some cases errors will persist. For this reason, wording in this document should be considered in the proper context and not strictly verbatim.          [1]   Social History  Tobacco Use    Smoking status: Never     Passive exposure: Never    Smokeless tobacco: Never   Substance Use Topics    Alcohol use: Never    Drug use: Never        Bushra Recinos PA-C  06/30/25 0318

## 2025-07-08 ENCOUNTER — HOSPITAL ENCOUNTER (EMERGENCY)
Facility: HOSPITAL | Age: 30
Discharge: HOME | End: 2025-07-08
Attending: EMERGENCY MEDICINE
Payer: COMMERCIAL

## 2025-07-08 VITALS
SYSTOLIC BLOOD PRESSURE: 126 MMHG | DIASTOLIC BLOOD PRESSURE: 78 MMHG | TEMPERATURE: 98 F | WEIGHT: 210 LBS | HEART RATE: 95 BPM | BODY MASS INDEX: 31.1 KG/M2 | RESPIRATION RATE: 20 BRPM | HEIGHT: 69 IN | OXYGEN SATURATION: 96 %

## 2025-07-08 DIAGNOSIS — R10.32 LEFT LOWER QUADRANT ABDOMINAL PAIN: Primary | ICD-10-CM

## 2025-07-08 LAB
ABSOLUTE EOSINOPHIL (OHS): 0.25 K/UL
ABSOLUTE MONOCYTE (OHS): 0.53 K/UL (ref 0.3–1)
ABSOLUTE NEUTROPHIL COUNT (OHS): 5.51 K/UL (ref 1.8–7.7)
ALBUMIN SERPL BCP-MCNC: 4 G/DL (ref 3.5–5.2)
ALP SERPL-CCNC: 81 UNIT/L (ref 40–150)
ALT SERPL W/O P-5'-P-CCNC: 48 UNIT/L (ref 10–44)
ANION GAP (OHS): 12 MMOL/L (ref 8–16)
AST SERPL-CCNC: 23 UNIT/L (ref 11–45)
BASOPHILS # BLD AUTO: 0.05 K/UL
BASOPHILS NFR BLD AUTO: 0.6 %
BILIRUB SERPL-MCNC: 0.9 MG/DL (ref 0.1–1)
BILIRUB UR QL STRIP.AUTO: NEGATIVE
BUN SERPL-MCNC: 13 MG/DL (ref 6–20)
CALCIUM SERPL-MCNC: 9.2 MG/DL (ref 8.7–10.5)
CHLORIDE SERPL-SCNC: 105 MMOL/L (ref 95–110)
CLARITY UR: CLEAR
CO2 SERPL-SCNC: 21 MMOL/L (ref 23–29)
COLOR UR AUTO: YELLOW
CREAT SERPL-MCNC: 1.1 MG/DL (ref 0.5–1.4)
ERYTHROCYTE [DISTWIDTH] IN BLOOD BY AUTOMATED COUNT: 12.9 % (ref 11.5–14.5)
GFR SERPLBLD CREATININE-BSD FMLA CKD-EPI: >60 ML/MIN/1.73/M2
GLUCOSE SERPL-MCNC: 102 MG/DL (ref 70–110)
GLUCOSE UR QL STRIP: NEGATIVE
HCT VFR BLD AUTO: 50.7 % (ref 40–54)
HGB BLD-MCNC: 16.7 GM/DL (ref 14–18)
HGB UR QL STRIP: NEGATIVE
IMM GRANULOCYTES # BLD AUTO: 0.04 K/UL (ref 0–0.04)
IMM GRANULOCYTES NFR BLD AUTO: 0.5 % (ref 0–0.5)
KETONES UR QL STRIP: NEGATIVE
LEUKOCYTE ESTERASE UR QL STRIP: NEGATIVE
LIPASE SERPL-CCNC: 16 U/L (ref 4–60)
LYMPHOCYTES # BLD AUTO: 1.67 K/UL (ref 1–4.8)
MAGNESIUM SERPL-MCNC: 1.9 MG/DL (ref 1.6–2.6)
MCH RBC QN AUTO: 28.3 PG (ref 27–31)
MCHC RBC AUTO-ENTMCNC: 32.9 G/DL (ref 32–36)
MCV RBC AUTO: 86 FL (ref 82–98)
NITRITE UR QL STRIP: NEGATIVE
NUCLEATED RBC (/100WBC) (OHS): 0 /100 WBC
PH UR STRIP: 8 [PH]
PLATELET # BLD AUTO: 317 K/UL (ref 150–450)
PMV BLD AUTO: 8.8 FL (ref 9.2–12.9)
POTASSIUM SERPL-SCNC: 4 MMOL/L (ref 3.5–5.1)
PROT SERPL-MCNC: 7.6 GM/DL (ref 6–8.4)
PROT UR QL STRIP: ABNORMAL
RBC # BLD AUTO: 5.9 M/UL (ref 4.6–6.2)
RELATIVE EOSINOPHIL (OHS): 3.1 %
RELATIVE LYMPHOCYTE (OHS): 20.7 % (ref 18–48)
RELATIVE MONOCYTE (OHS): 6.6 % (ref 4–15)
RELATIVE NEUTROPHIL (OHS): 68.5 % (ref 38–73)
SODIUM SERPL-SCNC: 138 MMOL/L (ref 136–145)
SP GR UR STRIP: 1.02
UROBILINOGEN UR STRIP-ACNC: NEGATIVE EU/DL
WBC # BLD AUTO: 8.05 K/UL (ref 3.9–12.7)

## 2025-07-08 PROCEDURE — 85025 COMPLETE CBC W/AUTO DIFF WBC: CPT | Performed by: EMERGENCY MEDICINE

## 2025-07-08 PROCEDURE — 81003 URINALYSIS AUTO W/O SCOPE: CPT | Performed by: EMERGENCY MEDICINE

## 2025-07-08 PROCEDURE — 96374 THER/PROPH/DIAG INJ IV PUSH: CPT

## 2025-07-08 PROCEDURE — 96361 HYDRATE IV INFUSION ADD-ON: CPT

## 2025-07-08 PROCEDURE — 83690 ASSAY OF LIPASE: CPT | Performed by: EMERGENCY MEDICINE

## 2025-07-08 PROCEDURE — 63600175 PHARM REV CODE 636 W HCPCS: Performed by: EMERGENCY MEDICINE

## 2025-07-08 PROCEDURE — 96375 TX/PRO/DX INJ NEW DRUG ADDON: CPT

## 2025-07-08 PROCEDURE — 25000003 PHARM REV CODE 250: Performed by: EMERGENCY MEDICINE

## 2025-07-08 PROCEDURE — 83735 ASSAY OF MAGNESIUM: CPT | Performed by: EMERGENCY MEDICINE

## 2025-07-08 PROCEDURE — 25500020 PHARM REV CODE 255: Performed by: EMERGENCY MEDICINE

## 2025-07-08 PROCEDURE — 80053 COMPREHEN METABOLIC PANEL: CPT | Performed by: EMERGENCY MEDICINE

## 2025-07-08 PROCEDURE — 99285 EMERGENCY DEPT VISIT HI MDM: CPT | Mod: 25

## 2025-07-08 PROCEDURE — 96376 TX/PRO/DX INJ SAME DRUG ADON: CPT

## 2025-07-08 RX ORDER — MORPHINE SULFATE 4 MG/ML
4 INJECTION, SOLUTION INTRAMUSCULAR; INTRAVENOUS
Refills: 0 | Status: COMPLETED | OUTPATIENT
Start: 2025-07-08 | End: 2025-07-08

## 2025-07-08 RX ORDER — DICYCLOMINE HYDROCHLORIDE 20 MG/1
20 TABLET ORAL 2 TIMES DAILY
Qty: 20 TABLET | Refills: 0 | Status: SHIPPED | OUTPATIENT
Start: 2025-07-08 | End: 2025-08-07

## 2025-07-08 RX ORDER — ONDANSETRON HYDROCHLORIDE 2 MG/ML
4 INJECTION, SOLUTION INTRAVENOUS
Status: COMPLETED | OUTPATIENT
Start: 2025-07-08 | End: 2025-07-08

## 2025-07-08 RX ORDER — ACETAMINOPHEN 500 MG
1000 TABLET ORAL
Status: COMPLETED | OUTPATIENT
Start: 2025-07-08 | End: 2025-07-08

## 2025-07-08 RX ORDER — ONDANSETRON 4 MG/1
4 TABLET, FILM COATED ORAL EVERY 6 HOURS
Qty: 12 TABLET | Refills: 0 | Status: SHIPPED | OUTPATIENT
Start: 2025-07-08

## 2025-07-08 RX ORDER — DOCUSATE SODIUM 100 MG/1
100 CAPSULE, LIQUID FILLED ORAL 2 TIMES DAILY
Qty: 60 CAPSULE | Refills: 0 | Status: SHIPPED | OUTPATIENT
Start: 2025-07-08

## 2025-07-08 RX ADMIN — ONDANSETRON 4 MG: 2 INJECTION INTRAMUSCULAR; INTRAVENOUS at 02:07

## 2025-07-08 RX ADMIN — SODIUM CHLORIDE, POTASSIUM CHLORIDE, SODIUM LACTATE AND CALCIUM CHLORIDE 1000 ML: 600; 310; 30; 20 INJECTION, SOLUTION INTRAVENOUS at 02:07

## 2025-07-08 RX ADMIN — ONDANSETRON 4 MG: 2 INJECTION INTRAMUSCULAR; INTRAVENOUS at 05:07

## 2025-07-08 RX ADMIN — IOHEXOL 100 ML: 350 INJECTION, SOLUTION INTRAVENOUS at 05:07

## 2025-07-08 RX ADMIN — ACETAMINOPHEN 1000 MG: 500 TABLET ORAL at 02:07

## 2025-07-08 RX ADMIN — MORPHINE SULFATE 4 MG: 4 INJECTION INTRAVENOUS at 05:07

## 2025-07-08 NOTE — ED PROVIDER NOTES
Encounter Date: 7/8/2025       History     Chief Complaint   Patient presents with    Abdominal Pain     C/o LLQ sharp ABD pain w/ N/V starting this morning. +fatigue. Too ibuprofen 2hr PTA w/o sx relief.      30-year-old male with past medical history as below presents with complaint of abdominal pain and nausea.  Patient says this morning he had onset of left-sided flank pain that has been intermittent onset.  He says he later had onset of associated nausea and vomiting.  He denies fever, chills, dysuria, hematuria.  No history of abdominal surgeries.  No recent antibiotic use, travel, or ill contacts.  He took 600 mg of ibuprofen around noon today. No hx of kidney stone. Denies testicular or groin pain.    The history is provided by the patient and a parent.     Review of patient's allergies indicates:  No Known Allergies  Past Medical History:   Diagnosis Date    Dehydration     Hypokalemia 7/31/2019    Migraine 7/31/2019    Seizure disorder 7/31/2019    Viral gastroenteritis 7/31/2019     History reviewed. No pertinent surgical history.  No family history on file.  Social History[1]  Review of Systems    Physical Exam     Initial Vitals [07/08/25 1329]   BP Pulse Resp Temp SpO2   (!) 129/92 104 20 98.2 °F (36.8 °C) 98 %      MAP       --         Physical Exam    Constitutional: He appears well-developed and well-nourished. He is not diaphoretic. No distress.   HENT:   Head: Normocephalic and atraumatic.   Eyes: EOM are normal.   Neck:   Normal range of motion.  Cardiovascular:  Normal rate.           Pulmonary/Chest: No respiratory distress.   Abdominal: Abdomen is soft. He exhibits no distension.   Musculoskeletal:         General: Normal range of motion.      Cervical back: Normal range of motion.     Neurological: He is alert and oriented to person, place, and time.   Skin: Skin is warm and dry.   Psychiatric: He has a normal mood and affect.         ED Course   Procedures  Labs Reviewed   COMPREHENSIVE  METABOLIC PANEL - Abnormal       Result Value    Sodium 138      Potassium 4.0      Chloride 105      CO2 21 (*)     Glucose 102      BUN 13      Creatinine 1.1      Calcium 9.2      Protein Total 7.6      Albumin 4.0      Bilirubin Total 0.9      ALP 81      AST 23      ALT 48 (*)     Anion Gap 12      eGFR >60     CBC WITH DIFFERENTIAL - Abnormal    WBC 8.05      RBC 5.90      HGB 16.7      HCT 50.7      MCV 86      MCH 28.3      MCHC 32.9      RDW 12.9      Platelet Count 317      MPV 8.8 (*)     Nucleated RBC 0      Neut % 68.5      Lymph % 20.7      Mono % 6.6      Eos % 3.1      Basophil % 0.6      Imm Grans % 0.5      Neut # 5.51      Lymph # 1.67      Mono # 0.53      Eos # 0.25      Baso # 0.05      Imm Grans # 0.04     URINALYSIS, REFLEX TO URINE CULTURE - Abnormal    Color, UA Yellow      Appearance, UA Clear      pH, UA 8.0      Spec Grav UA 1.025      Protein, UA Trace (*)     Glucose, UA Negative      Ketones, UA Negative      Bilirubin, UA Negative      Blood, UA Negative      Nitrites, UA Negative      Urobilinogen, UA Negative      Leukocyte Esterase, UA Negative     MAGNESIUM - Normal    Magnesium  1.9     LIPASE - Normal    Lipase Level 16     CBC W/ AUTO DIFFERENTIAL    Narrative:     The following orders were created for panel order CBC auto differential.  Procedure                               Abnormality         Status                     ---------                               -----------         ------                     CBC with Differential[[id number]]       Abnormal            Final result                 Please view results for these tests on the individual orders.          Imaging Results              CT Abdomen Pelvis With IV Contrast NO Oral Contrast (Final result)  Result time 07/08/25 18:23:38      Final result by Zoran Guillen DO (07/08/25 18:23:38)                   Impression:      No acute abnormality of the abdomen or pelvis.      Electronically signed by: Zoran  Mindy  Date:    07/08/2025  Time:    18:23               Narrative:    EXAMINATION:  CT ABDOMEN PELVIS WITH IV CONTRAST    CLINICAL HISTORY:  LLQ abdominal pain;    TECHNIQUE:  Axial CT images with sagittal and coronal reformats were obtained of the abdomen and pelvis from the hemidiaphragms through the symphysis pubis after the administration of 100mL Omnipaque 350.    COMPARISON:  CT abdomen and pelvis from 02/24/2025.    FINDINGS:  Lung Bases: Clear.    Heart: Heart size is normal.  No pericardial effusion.    Liver: There is decreased attenuation of the hepatic parenchyma, which can be seen with hepatic steatosis.  The liver is enlarged.  There are no focal hepatic lesions.  The portal vasculature is patent.    Biliary tract: No intrahepatic or extrahepatic biliary ductal dilatation.    Gallbladder: No radiodense gallstone. No wall thickening or pericholecystic fluid.    Pancreas: Normal. No pancreatic ductal dilatation.    Spleen: Normal size without focal lesion.    Adrenals: Unremarkable.    Kidneys and urinary collecting systems: Normal.  No hydronephrosis or urolithiasis.    Lymph nodes: None enlarged.    Stomach and bowel: The stomach is normal.  Loops of small and large bowel are normal in caliber without evidence for inflammation or obstruction.  The appendix is normal.    Peritoneum and mesentery: No ascites or free intraperitoneal air.  No abdominal fluid collection.    Vasculature: No aneurysm or significant atherosclerosis.    Urinary bladder: No wall thickening.    Reproductive organs: The prostate is unremarkable.    Body wall: No abnormality.    Musculoskeletal: No aggressive osseous lesion.                                       Medications   ondansetron injection 4 mg (4 mg Intravenous Given 7/8/25 1417)   lactated ringers bolus 1,000 mL (0 mLs Intravenous Stopped 7/8/25 1514)   acetaminophen tablet 1,000 mg (1,000 mg Oral Given 7/8/25 1422)   morphine injection 4 mg (4 mg Intravenous Given  7/8/25 1708)   ondansetron injection 4 mg (4 mg Intravenous Given 7/8/25 1708)   iohexoL (OMNIPAQUE 350) injection 100 mL (100 mLs Intravenous Given 7/8/25 1737)     Medical Decision Making  Patient signed out to me pending reassessment, disposition, possible imaging.  Labs reassuring.  Patient is having tenderness to left lower quadrant.  Medications ordered above with improvement of symptoms.  CT reassuring.  Repeat exam is reassuring.  Tolerating p.o..  Discharged with outpatient follow-up. I discussed with the patient/family the diagnosis, treatment plan, indications for return to the emergency department, and for expected follow-up. The patient/family verbalized an understanding. The patient/family is asked if there are any questions or concerns. We discuss the case, until all issues are addressed to the patient/family's satisfaction. Patient/family understands and is agreeable to the plan.  Patient not have any testicular complaints.  Carlos Shaikh    DISCLAIMER: This note was prepared with [employer] voice recognition transcription software.       Amount and/or Complexity of Data Reviewed  Labs: ordered. Decision-making details documented in ED Course.  Radiology: ordered.    Risk  OTC drugs.  Prescription drug management.               ED Course as of 07/08/25 1856   Tue Jul 08, 2025   1430 CBC auto differential(!)  Normal  [AT]   1455 Lipase: 16  Normal [AT]      ED Course User Index  [AT] Dalia Gonzalez MD                           Clinical Impression:  Final diagnoses:  [R10.32] Left lower quadrant abdominal pain (Primary)          ED Disposition Condition    Discharge Stable          ED Prescriptions       Medication Sig Dispense Start Date End Date Auth. Provider    dicyclomine (BENTYL) 20 mg tablet Take 1 tablet (20 mg total) by mouth 2 (two) times daily. 20 tablet 7/8/2025 8/7/2025 Carlos Shaikh MD    ondansetron (ZOFRAN) 4 MG tablet Take 1 tablet (4 mg total) by mouth every 6 (six) hours. 12  tablet 7/8/2025 -- Carlos Shaikh MD    docusate sodium (COLACE) 100 MG capsule Take 1 capsule (100 mg total) by mouth 2 (two) times daily. 60 capsule 7/8/2025 -- Carlos Shaikh MD          Follow-up Information       Follow up With Specialties Details Why Contact Info Additional Information    [facility] Family Medicine Schedule an appointment as soon as possible for a visit in 2 days  [address]  [address] [address] [phone] Please park in [address] C or D and use Josette martinez. Take Medical Office Bldg. elevators.                   [1]   Social History  Tobacco Use    Smoking status: Never     Passive exposure: Never    Smokeless tobacco: Never   Substance Use Topics    Alcohol use: Never    Drug use: Never        Carlos Shaikh MD  07/08/25 5913

## 2025-07-08 NOTE — ED TRIAGE NOTES
Patient arrives to the ED w/ complaints of LLQ abdominal pain, radiating to L flank. Reports constant 3/10 dull pain w/ intermittent 8/10 sharp, stabbing pain. Reports trying to eat egg roll at restaurant for lunch, but immediately became nauseous. Reports 3 emetic episodes in the car, driving from restaurant to ED. Denies dysuria, hematuria, fever. Reports taking ~ 600 mg ibuprofen 2 hr PTA w/ no relief.

## 2025-07-08 NOTE — DISCHARGE INSTRUCTIONS

## 2025-07-08 NOTE — ED NOTES
Patient resting in bed. Updated on plan of care. Mom at bedside. Side rails up x 2. Call light within reach. Breathing unlabored and even. Denies CP. A/o x 4 GCS 15. Patient endorses improvement to pain and nausea. Still endorses 3/10 LLQ abdominal pain. No swelling or edema. + 2 distal pulses.

## 2025-07-08 NOTE — Clinical Note
"Flo Nix" Rosana was seen and treated in our emergency department on 7/8/2025.  He may return to work on 07/10/2025.       If you have any questions or concerns, please don't hesitate to call.      Carlos Shaikh MD"